# Patient Record
Sex: MALE | Race: WHITE | Employment: OTHER | URBAN - METROPOLITAN AREA
[De-identification: names, ages, dates, MRNs, and addresses within clinical notes are randomized per-mention and may not be internally consistent; named-entity substitution may affect disease eponyms.]

---

## 2017-11-20 ENCOUNTER — OFFICE VISIT (OUTPATIENT)
Dept: HEMATOLOGY | Age: 67
End: 2017-11-20

## 2017-11-20 ENCOUNTER — HOSPITAL ENCOUNTER (OUTPATIENT)
Dept: LAB | Age: 67
Discharge: HOME | End: 2017-11-20
Attending: FAMILY MEDICINE
Payer: MEDICARE

## 2017-11-20 VITALS
WEIGHT: 183 LBS | SYSTOLIC BLOOD PRESSURE: 103 MMHG | DIASTOLIC BLOOD PRESSURE: 38 MMHG | OXYGEN SATURATION: 95 % | RESPIRATION RATE: 18 BRPM | HEART RATE: 72 BPM | TEMPERATURE: 97.7 F | HEIGHT: 68 IN | BODY MASS INDEX: 27.74 KG/M2

## 2017-11-20 DIAGNOSIS — R74.8 ELEVATED LIVER ENZYMES: Primary | ICD-10-CM

## 2017-11-20 PROBLEM — Z95.828 S/P FEMORAL-POPLITEAL BYPASS SURGERY: Status: ACTIVE | Noted: 2017-11-20

## 2017-11-20 PROBLEM — Z95.2 H/O AORTIC VALVE REPLACEMENT: Status: ACTIVE | Noted: 2017-11-20

## 2017-11-20 PROBLEM — Z98.890 H/O ARTHROSCOPIC KNEE SURGERY: Status: ACTIVE | Noted: 2017-11-20

## 2017-11-20 PROBLEM — I73.9 PERIPHERAL VASCULAR DISEASE (HCC): Status: ACTIVE | Noted: 2017-11-20

## 2017-11-20 PROBLEM — I25.10 CAD (CORONARY ARTERY DISEASE): Status: ACTIVE | Noted: 2017-11-20

## 2017-11-20 PROBLEM — Z95.0 S/P PLACEMENT OF CARDIAC PACEMAKER: Status: ACTIVE | Noted: 2017-11-20

## 2017-11-20 LAB
ALBUMIN SERPL-MCNC: 3.2 G/DL (ref 3.4–5)
ALBUMIN/GLOB SERPL: 0.9 {RATIO} (ref 0.8–1.7)
ALP SERPL-CCNC: 194 U/L (ref 45–117)
ALT SERPL-CCNC: 49 U/L (ref 16–61)
ANION GAP SERPL CALC-SCNC: 9 MMOL/L (ref 3–18)
AST SERPL-CCNC: 35 U/L (ref 15–37)
BASOPHILS # BLD: 0 K/UL (ref 0–0.06)
BASOPHILS NFR BLD: 0 % (ref 0–2)
BILIRUB DIRECT SERPL-MCNC: 0.1 MG/DL (ref 0–0.2)
BILIRUB SERPL-MCNC: 0.4 MG/DL (ref 0.2–1)
BUN SERPL-MCNC: 44 MG/DL (ref 7–18)
BUN/CREAT SERPL: 20 (ref 12–20)
CALCIUM SERPL-MCNC: 8.6 MG/DL (ref 8.5–10.1)
CHLORIDE SERPL-SCNC: 110 MMOL/L (ref 100–108)
CHOLEST SERPL-MCNC: 105 MG/DL
CO2 SERPL-SCNC: 24 MMOL/L (ref 21–32)
CREAT SERPL-MCNC: 2.19 MG/DL (ref 0.6–1.3)
DIFFERENTIAL METHOD BLD: ABNORMAL
EOSINOPHIL # BLD: 0.1 K/UL (ref 0–0.4)
EOSINOPHIL NFR BLD: 4 % (ref 0–5)
ERYTHROCYTE [DISTWIDTH] IN BLOOD BY AUTOMATED COUNT: 14 % (ref 11.6–14.5)
EST. AVERAGE GLUCOSE BLD GHB EST-MCNC: 140 MG/DL
FERRITIN SERPL-MCNC: 246 NG/ML (ref 8–388)
GLOBULIN SER CALC-MCNC: 3.4 G/DL (ref 2–4)
GLUCOSE SERPL-MCNC: 183 MG/DL (ref 74–99)
HBA1C MFR BLD: 6.5 % (ref 4.5–5.6)
HCT VFR BLD AUTO: 34.4 % (ref 36–48)
HDLC SERPL-MCNC: 23 MG/DL (ref 40–60)
HDLC SERPL: 4.6 {RATIO} (ref 0–5)
HGB BLD-MCNC: 11 G/DL (ref 13–16)
INR PPP: 1.2 (ref 0.8–1.2)
IRON SATN MFR SERPL: 33 %
IRON SERPL-MCNC: 102 UG/DL (ref 50–175)
LDLC SERPL CALC-MCNC: 65.4 MG/DL (ref 0–100)
LIPID PROFILE,FLP: ABNORMAL
LYMPHOCYTES # BLD: 1 K/UL (ref 0.9–3.6)
LYMPHOCYTES NFR BLD: 28 % (ref 21–52)
MCH RBC QN AUTO: 29.6 PG (ref 24–34)
MCHC RBC AUTO-ENTMCNC: 32 G/DL (ref 31–37)
MCV RBC AUTO: 92.7 FL (ref 74–97)
MONOCYTES # BLD: 0.4 K/UL (ref 0.05–1.2)
MONOCYTES NFR BLD: 10 % (ref 3–10)
NEUTS SEG # BLD: 2.1 K/UL (ref 1.8–8)
NEUTS SEG NFR BLD: 58 % (ref 40–73)
PLATELET # BLD AUTO: 88 K/UL (ref 135–420)
PMV BLD AUTO: 12.3 FL (ref 9.2–11.8)
POTASSIUM SERPL-SCNC: 5.9 MMOL/L (ref 3.5–5.5)
PROT SERPL-MCNC: 6.6 G/DL (ref 6.4–8.2)
PROTHROMBIN TIME: 15 SEC (ref 11.5–15.2)
RBC # BLD AUTO: 3.71 M/UL (ref 4.7–5.5)
SODIUM SERPL-SCNC: 143 MMOL/L (ref 136–145)
TIBC SERPL-MCNC: 313 UG/DL (ref 250–450)
TRIGL SERPL-MCNC: 83 MG/DL (ref ?–150)
VLDLC SERPL CALC-MCNC: 16.6 MG/DL
WBC # BLD AUTO: 3.6 K/UL (ref 4.6–13.2)

## 2017-11-20 PROCEDURE — 86704 HEP B CORE ANTIBODY TOTAL: CPT

## 2017-11-20 PROCEDURE — 87340 HEPATITIS B SURFACE AG IA: CPT

## 2017-11-20 PROCEDURE — 80048 BASIC METABOLIC PNL TOTAL CA: CPT

## 2017-11-20 PROCEDURE — 82107 ALPHA-FETOPROTEIN L3: CPT

## 2017-11-20 PROCEDURE — 82103 ALPHA-1-ANTITRYPSIN TOTAL: CPT

## 2017-11-20 PROCEDURE — 83516 IMMUNOASSAY NONANTIBODY: CPT

## 2017-11-20 PROCEDURE — 86706 HEP B SURFACE ANTIBODY: CPT

## 2017-11-20 PROCEDURE — 80076 HEPATIC FUNCTION PANEL: CPT

## 2017-11-20 PROCEDURE — 80061 LIPID PANEL: CPT

## 2017-11-20 PROCEDURE — 82728 ASSAY OF FERRITIN: CPT

## 2017-11-20 PROCEDURE — 85025 COMPLETE CBC W/AUTO DIFF WBC: CPT

## 2017-11-20 PROCEDURE — 36415 COLL VENOUS BLD VENIPUNCTURE: CPT

## 2017-11-20 PROCEDURE — 86038 ANTINUCLEAR ANTIBODIES: CPT

## 2017-11-20 PROCEDURE — 86803 HEPATITIS C AB TEST: CPT

## 2017-11-20 PROCEDURE — 86708 HEPATITIS A ANTIBODY: CPT

## 2017-11-20 PROCEDURE — 83036 HEMOGLOBIN GLYCOSYLATED A1C: CPT

## 2017-11-20 PROCEDURE — 83540 ASSAY OF IRON: CPT

## 2017-11-20 PROCEDURE — 85610 PROTHROMBIN TIME: CPT

## 2017-11-20 RX ORDER — ATORVASTATIN CALCIUM 40 MG/1
TABLET, FILM COATED ORAL DAILY
COMMUNITY

## 2017-11-20 RX ORDER — FUROSEMIDE 40 MG/1
80 TABLET ORAL
COMMUNITY

## 2017-11-20 RX ORDER — PREGABALIN 100 MG/1
100 CAPSULE ORAL 3 TIMES DAILY
COMMUNITY

## 2017-11-20 RX ORDER — TAMSULOSIN HYDROCHLORIDE 0.4 MG/1
0.4 CAPSULE ORAL DAILY
COMMUNITY

## 2017-11-20 RX ORDER — CLOPIDOGREL BISULFATE 75 MG/1
75 TABLET ORAL DAILY
COMMUNITY

## 2017-11-20 RX ORDER — DICLOXACILLIN SODIUM 500 MG/1
CAPSULE ORAL 2 TIMES DAILY
COMMUNITY

## 2017-11-20 NOTE — PROGRESS NOTES
Humble Collier is a 74 y.o. male    No chief complaint on file. 1. Have you been to the ER, urgent care clinic or hospitalized since your last visit? NO.     2. Have you seen or consulted any other health care providers outside of the [address] [place] [address] since your last visit (Include any pap smears or colon screening)?  NO        Learning Assessment 11/20/2017   PRIMARY LEARNER Patient   BARRIERS PRIMARY LEARNER NONE   CO-LEARNER CAREGIVER No   PRIMARY LANGUAGE ENGLISH   LEARNER PREFERENCE PRIMARY OTHER (COMMENT)   ANSWERED BY patient   RELATIONSHIP SELF

## 2017-11-20 NOTE — PROGRESS NOTES
[address] Martinez MD, Radha Fierro, Cite [place], [place]       Vern Duncan, TARUN Gold, RENATO Marie, JOYCE-TARUN Orozco NP        at [address], [address] [place] [address], [facility].     [phone]     FAX: [phone]    at [facility], [address] [place] [address], [address] [facility] [address]     [phone]     FAX: [phone]         Patient Care Team:  Hakeem Villatoro MD as PCP - General (Family Practice)      Problem List  Date Reviewed: 11/20/2017          Codes Class Noted    H/O aortic valve replacement ICD-10-CM: Z95.2  ICD-9-CM: V43.3  11/20/2017        S/P placement of cardiac pacemaker ICD-10-CM: Z95.0  ICD-9-CM: V45.01  11/20/2017        S/P femoral-popliteal bypass surgery ICD-10-CM: Z95.828  ICD-9-CM: V45.89  11/20/2017        Peripheral vascular disease ([facility].) ICD-10-CM: I73.9  ICD-9-CM: 443.9  11/20/2017        CAD (coronary artery disease) ICD-10-CM: I25.10  ICD-9-CM: 414.00  11/20/2017        H/O arthroscopic knee surgery ICD-10-CM: Z98.890  ICD-9-CM: V45.89  11/20/2017        Elevated liver enzymes ICD-10-CM: R74.8  ICD-9-CM: 790.5  11/20/2017        Chronic kidney disease, stage III (moderate) ICD-10-CM: N18.3  ICD-9-CM: 585.3  6/16/2015        Type II diabetes mellitus ([facility] ICD-10-CM: E11.9  ICD-9-CM: 250.00  6/16/2015        Hypertension ICD-10-CM: I10  ICD-9-CM: 401.9  6/16/2015        CHF (congestive heart failure) ([facility] ICD-10-CM: I50.9  ICD-9-CM: 428.0  6/4/2015                The physicians listed above have asked me to see Dixie Shore in consultation regarding elevated liver enzymes and its management. All medical records sent by the referring physicians were reviewed including imaging studies     The patient is a 74 y.o.   male who was first noted to have abnormalities in liver transaminases and alkaline phosphate in 2/2017. Serologic evaluation for markers of chronic liver disease were negative for HCV. Ultrasound of the liver was performed in 9/2017. The results of the imaging suggested chronic liver disease. An assessment of liver fibrosis with biopsy or elastography has not been performed. The most recent laboratory studies indicate that the liver transaminases are elevated, ALP is elevated, tests of hepatic synthetic and metabolic function are normal, and the platelet count is depressed. The patient has no symptoms which could be attributed to the liver disorder. The patient completes all daily activities without any functional limitations. The patient has not experienced fatigue, pain in the right side over the liver,       ALLERGIES  Allergies   Allergen Reactions    Amoxicillin Rash    Levaquin [Levofloxacin] Rash       MEDICATIONS  Current Outpatient Prescriptions   Medication Sig    clopidogrel (PLAVIX) 75 mg tab Take  by mouth.  pregabalin (LYRICA) 100 mg capsule Take  by mouth two (2) times a day.  insulin NPH/insulin regular (HUMULIN 70/30) 100 unit/mL (70-30) injection by SubCUTAneous route.  furosemide (LASIX) 40 mg tablet Take  by mouth daily.  tamsulosin (FLOMAX) 0.4 mg capsule Take 0.4 mg by mouth daily.  dicloxacillin (DYNAPEN) 500 mg capsule Take  by mouth Before breakfast, lunch, dinner and at bedtime.  atorvastatin (LIPITOR) 40 mg tablet Take  by mouth daily.  aspirin 81 mg chewable tablet Take 1 Tab by mouth daily.  oxyCODONE-acetaminophen (PERCOCET) 5-325 mg per tablet Take 1 Tab by mouth every six (6) hours as needed. Max Daily Amount: 4 Tabs.  b complex-vitamin c-folic acid (NEPHROCAPS) 1 mg capsule Take 1 Cap by mouth daily.  cyanocobalamin (VITAMIN B12) 500 mcg tablet Take 1 Tab by mouth daily.  simvastatin (ZOCOR) 20 mg tablet Take 1 Tab by mouth nightly.     sodium hypochlorite (QUARTER STRENGTH DAKIN'S) 0.125 % soln external solution Apply Topically as directed to both legs Twice daily    insulin lispro (HUMALOG) 100 unit/mL injection Check FSBS Three times daily with meals,   For sugar between 150 and 200- give 1 units SQ,   For sugar between 201 and 250- give 3 units SQ,   For sugar between 251 and 300- give 5 units SQ,   For sugar between 301 and 400- give 7 units SQ,  For sugars > 400, contact PCP    metoprolol (LOPRESSOR) 25 mg tablet Take 0.5 Tabs by mouth every twelve (12) hours.  OTHER Check CBC, CMP, Mg in 4 days, results to PCP and Dr Pauly Lee immediately      Dx- CKD2     No current facility-administered medications for this visit. SYSTEM REVIEW NOT RELATED TO LIVER DISEASE OR REVIEWED ABOVE:  Constitution systems: Negative for fever, chills, weight gain, weight loss. Eyes: Negative for visual changes. ENT: Negative for sore throat, painful swallowing. Respiratory: Negative for cough, hemoptysis, SOB. Cardiology: Negative for chest pain, palpitations. GI:  Negative for constipation or diarrhea. : Negative for urinary frequency, dysuria, hematuria, nocturia. Skin: Negative for rash. Hematology: Negative for easy bruising, blood clots. Musculo-skelatal: Negative for back pain, muscle pain, weakness. Neurologic: Negative for headaches, dizziness, vertigo, memory problems not related to HE. Psychology: Negative for anxiety, depression. FAMILY HISTORY:  The father  of alcohol cirrhosis. The mother is alive and healthy at age 80 years. There is no family history of liver disease. SOCIAL HISTORY:  The patient is . The patient has no children. The patient stopped using tobacco products in 2012. The patient has previously consumed alcohol socially never in excess. The patient has been abstinent from alcohol since . The patient used to work as a . The patient retired in .         PHYSICAL EXAMINATION:  Visit Vitals    BP (!) 103/38 (BP 1 Location: Right arm, BP Patient Position: Sitting)    Pulse 72    Temp 97.7 °F (36.5 °C) (Tympanic)    Resp 18    Ht 5' 8\" (1.727 m)    Wt 183 lb (83 kg)    SpO2 95%    BMI 27.83 kg/m2     General: No acute distress. Eyes: Sclera anicteric. ENT: No oral lesions. Thyroid normal.  Nodes: No adenopathy. Skin: No spider angiomata. No jaundice. No palmar erythema. Respiratory: Lungs clear to auscultation. Cardiovascular: Regular heart rate. No murmurs. No JVD. Abdomen: Soft non-tender. Liver size normal to percussion/palpation. Spleen not palpable. No obvious ascites. Extremities: No edema. No muscle wasting. No gross arthritic changes. Neurologic: Alert and oriented. Cranial nerves grossly intact. No asterixis.     LABORATORY STUDIES:  [facility] [place] [address] Units 11/20/2017   WBC 3.4 - 10.8 x10E3/uL 3.6 (L)   ANC 1.4 - 7.0 x10E3/uL 2.1   HGB 13.0 - 17.7 g/dL 11.0 (L)    - 379 x10E3/uL 88 (L)   INR 0.8 - 1.2   1.2   AST 0 - 40 IU/L 35   ALT 0 - 44 IU/L 49   Alk Phos 39 - 117 IU/L 194 (H)   Bili, Total 0.0 - 1.2 mg/dL 0.4   Bili, Direct 0.00 - 0.40 mg/dL 0.1   Albumin 3.6 - 4.8 g/dL 3.2 (L)   BUN 8 - 27 mg/dL 44 (H)   Creat 0.76 - 1.27 mg/dL 2.19 (H)   Na 134 - 144 mmol/L 143   K 3.5 - 5.2 mmol/L 5.9 (H)   Cl 96 - 106 mmol/L 110 (H)   CO2 18 - 29 mmol/L 24   Glucose 65 - 99 mg/dL 183 (H)   Magnesium 1.8 - 2.4 mg/dL    Ammonia 11 - 32 UMOL/L      SEROLOGIES:  Serologies Latest Ref Rng & Units 11/20/2017   Hep A Ab, Total NEGATIVE   NEGATIVE   Hep B Surface Ag <1.00 Index <0.10   Hep B Surface Ag Interp NEG   NEGATIVE   Hep B Core Ab, Total NEGATIVE   NEGATIVE   Hep B Surface Ab >10.0 mIU/mL <3.10 (L)   Hep B Surface Ab Interp POS   NEGATIVE (A)   Hep C Ab 0.0 - 0.9 s/co ratio <0.1   Ferritin 8 - 388 NG/   Iron % Saturation % 33   MARIOLA, IFA  NEGATIVE   C-ANCA Neg:<1:20 titer    P-ANCA Neg:<1:20 titer    ANCA Neg:<1:20 titer    ASMCA 0 - 19 Units 24 (H)   M2 Ab 0.0 - 20.0 Units 3.7   Alpha-1 antitrypsin level 90 - 200 mg/dL 163     LIVER HISTOLOGY:  Not available or performed    ENDOSCOPIC PROCEDURES:  Not available or performed    RADIOLOGY:  Not available or performed    OTHER TESTING:  Not available or performed    ASSESSMENT AND PLAN:  Persistent elevation in alkaline phosphate of unclear etiology at this time. The serum albumin is reduced. The platelet count is depressed. The patient has CKD that is probably fom DM and heart disease. The low serum albumin may be from urine protein loss. Based upon laboratory studies and imaging the patient may have cirrhosis. Serologic testing to help define the cause of the laboratory abnormality were ordered. Results were positive for ASMA. The most likely causes for the liver chemistry abnormalities were discussed with the patient and include immune liver disorders,     Will perform laboratory testing to monitor liver function and degree of liver injury. This included BMP, hepatic panel, CBC with platelet count, INR. Will perform imaging of the liver with ultrasound. The need to perform a liver biopsy to help determine the cause and severity of the liver test abnormalities was discussed. The risks of performing the liver biopsy including pain, puncture of the lung, gallbladder, intestine or kidney and bleeding were discussed. Will defer liver biopsy for now. The patient was directed to continue all current medications at the current dosages. There are no contraindications for the patient to take any medications that are necessary for treatment of other medical issues. The patient was counseled regarding alcohol consumption. Vaccination for viral hepatitis A is recommended since the patient has no serologic evidence of previous exposure or vaccination with immunity. All of the above issues were discussed with the patient. All questions were answered.   The patient expressed a clear understanding of the above. [facility] 87 in 4 weeks to review all data and determine the treatment plan.     Viviana Giron MD  [facility] [place] of [address] [place] [address], [address] [place] [address] [place]uly Norris, [address] [place] [address] Street [address]  [phone]

## 2017-11-20 NOTE — MR AVS SNAPSHOT
Visit Information Date & Time Provider Department Dept. Phone Encounter #  
 11/20/2017 10:00 AM [facility]  [address] [id number] Follow-up Instructions Return in about 4 weeks (around 12/18/2017) for MLS. Upcoming Health Maintenance Date Due Hepatitis C Screening 1950 MICROALBUMIN Q1 6/8/1962 EYE EXAM RETINAL OR DILATED Q1 6/8/1962 DTaP/Tdap/Td series (1 - Tdap) 6/8/1973 FOBT Q 1 YEAR AGE 50-75 6/8/2002 ZOSTER VACCINE AGE 60> 4/8/2012 HEMOGLOBIN A1C Q6M 12/4/2015 LIPID PANEL Q1 6/5/2016 FOOT EXAM Q1 6/9/2016 GLAUCOMA SCREENING Q2Y 6/8/2017 Pneumococcal 65+ Low/Medium Risk (1 of 2 - PCV13) 6/8/2017 MEDICARE YEARLY EXAM 6/8/2017 Influenza Age 5 to Adult 8/1/2017 Allergies as of 11/20/2017  Review Complete On: 11/20/2017 By: May Smallwood Severity Noted Reaction Type Reactions Amoxicillin  06/04/2015    Rash Levaquin [Levofloxacin]  06/04/2015    Rash Current Immunizations  Never Reviewed No immunizations on file. Not reviewed this visit You Were Diagnosed With   
  
 Codes Comments Elevated liver enzymes    -  Primary ICD-10-CM: R74.8 ICD-9-CM: 790.5 Vitals BP Pulse Temp Resp Height(growth percentile) (!) 103/38 (BP 1 Location: Right arm, BP Patient Position: Sitting) 72 97.7 °F (36.5 °C) (Tympanic) 18 5' 8\" (1.727 m) Weight(growth percentile) SpO2 BMI Smoking Status 183 lb (83 kg) 95% 27.83 kg/m2 Never Smoker BMI and BSA Data Body Mass Index Body Surface Area  
 27.83 kg/m 2 2 m 2 Your Updated Medication List  
  
   
This list is accurate as of: 11/20/17 11:04 AM.  Always use your most recent med list.  
  
  
  
  
 aspirin 81 mg chewable tablet Take 1 Tab by mouth daily. b complex-vitamin c-folic acid 1 mg capsule Commonly known as:  Sierra Smithton Take 1 Cap by mouth daily. cyanocobalamin 500 mcg tablet Commonly known as:  VITAMIN B12 Take 1 Tab by mouth daily. dicloxacillin 500 mg capsule Commonly known as:  Russella Fought Take  by mouth Before breakfast, lunch, dinner and at bedtime. HumuLIN 70/30 100 unit/mL (70-30) injection Generic drug:  insulin NPH/insulin regular  
by SubCUTAneous route. insulin lispro 100 unit/mL injection Commonly known as:  HUMALOG Check FSBS Three times daily with meals,  For sugar between 150 and 200- give 1 units SQ,  For sugar between 201 and 250- give 3 units SQ,  For sugar between 251 and 300- give 5 units SQ,  For sugar between 301 and 400- give 7 units SQ, For sugars > 400, contact PCP  
  
 LASIX 40 mg tablet Generic drug:  furosemide Take  by mouth daily. LIPITOR 40 mg tablet Generic drug:  atorvastatin Take  by mouth daily. LYRICA 100 mg capsule Generic drug:  pregabalin Take  by mouth two (2) times a day. metoprolol tartrate 25 mg tablet Commonly known as:  LOPRESSOR Take 0.5 Tabs by mouth every twelve (12) hours. OTHER Check CBC, CMP, Mg in 4 days, results to PCP and Dr Leah Baumgarten immediately   Dx- Carrie Salt oxyCODONE-acetaminophen 5-325 mg per tablet Commonly known as:  PERCOCET Take 1 Tab by mouth every six (6) hours as needed. Max Daily Amount: 4 Tabs. PLAVIX 75 mg Tab Generic drug:  clopidogrel Take  by mouth. simvastatin 20 mg tablet Commonly known as:  ZOCOR Take 1 Tab by mouth nightly.  
  
 sodium hypochlorite 0.125 % Soln external solution Commonly known as:  [address] Apply Topically as directed to both legs Twice daily  
  
 tamsulosin 0.4 mg capsule Commonly known as:  FLOMAX Take 0.4 mg by mouth daily. Follow-up Instructions Return in about 4 weeks (around 12/18/2017) for MLS. To-Do List   
 11/20/2017 Lab:  ACTIN (SMOOTH MUSCLE) ANTIBODY   
  
 11/20/2017 Lab:  AFP WITH AFP-L3% 11/20/2017 Lab:  ALPHA-1-ANTITRYPSIN, TOTAL   
  
 11/20/2017 Lab:  ANTINUCLEAR ANTIBODIES, IFA   
  
 11/20/2017 Lab:  CBC WITH AUTOMATED DIFF   
  
 11/20/2017 Lab:  FERRITIN   
  
 11/20/2017 Lab:  HCV AB W/REFLEX VERIFICATION   
  
 11/20/2017 Lab:  HEMOGLOBIN A1C WITH EAG   
  
 11/20/2017 Lab:  HEP A AB, TOTAL   
  
 11/20/2017 Lab:  HEP B SURFACE AB   
  
 11/20/2017 Lab:  HEP B SURFACE AG   
  
 11/20/2017 Lab:  HEPATIC FUNCTION PANEL   
  
 11/20/2017 Lab:  HEPATITIS B CORE AB, TOTAL   
  
 11/20/2017 Lab:  IRON PROFILE   
  
 11/20/2017 Lab:  LIPID PANEL   
  
 11/20/2017 Lab:  METABOLIC PANEL, BASIC   
  
 11/20/2017 Lab:  MITOCHONDRIAL M2 AB   
  
 11/20/2017 Lab:  PROTHROMBIN TIME + INR   
  
 11/20/2017 Imaging:  US ABD LTD W ELASTOGRAPHY Introducing \[facility] [place] [facility]\"" & HEALTH SERVICES! Diana Magallanes introduces [employer] patient portal. Now you can access parts of your medical record, email your doctor's office, and request medication refills online. 1. In your internet browser, go to [email or web address] 2. Click on the First Time User? Click Here link in the Sign In box. You will see the New Member Sign Up page. 3. Enter your [employer] Access Code exactly as it appears below. You will not need to use this code after youve completed the sign-up process. If you do not sign up before the expiration date, you must request a new code. · [employer] Access Code: [email or web address] Expires: 2/18/2018 11:04 AM 
 
4. Enter the last four digits of your Social Security Number (xxxx) and Date of Birth (mm/dd/yyyy) as indicated and click Submit. You will be taken to the next sign-up page. 5. Create a Zadbyt ID. This will be your [employer] login ID and cannot be changed, so think of one that is secure and easy to remember. 6. Create a Zadbyt password. You can change your password at any time. 7. Enter your Password Reset Question and Answer. This can be used at a later time if you forget your password. 8. Enter your e-mail address. You will receive e-mail notification when new information is available in [address] 9. Click Sign Up. You can now view and download portions of your medical record. 10. Click the Download Summary menu link to download a portable copy of your medical information. If you have questions, please visit the Frequently Asked Questions section of the Before the Call website. Remember, Before the Call is NOT to be used for urgent needs. For medical emergencies, dial 911. Now available from your iPhone and Android! Please provide this summary of care documentation to your next provider. Your primary care clinician is listed as [address] If you have any questions after today's visit, please call [phone].

## 2017-11-21 LAB
A1AT SERPL-MCNC: 163 MG/DL (ref 90–200)
ACTIN IGG SERPL-ACNC: 24 UNITS (ref 0–19)
AFP L3 MFR SERPL: NORMAL % (ref 0–9.9)
AFP SERPL-MCNC: 2 NG/ML (ref 0–8)
COMMENT, 144067: NORMAL
HAV AB SER QL IA: NEGATIVE
HBV CORE AB SERPL QL IA: NEGATIVE
HBV SURFACE AB SER QL IA: NEGATIVE
HBV SURFACE AB SERPL IA-ACNC: <3.1 MIU/ML
HBV SURFACE AG SER QL: <0.1 INDEX
HBV SURFACE AG SER QL: NEGATIVE
HCV AB S/CO SERPL IA: <0.1 S/CO RATIO (ref 0–0.9)
HEP BS AB COMMENT,HBSAC: ABNORMAL
MITOCHONDRIA M2 IGG SER-ACNC: 3.7 UNITS (ref 0–20)

## 2017-11-22 LAB — ANA TITR SER IF: NEGATIVE {TITER}

## 2017-12-13 ENCOUNTER — HOSPITAL ENCOUNTER (OUTPATIENT)
Dept: ULTRASOUND IMAGING | Age: 67
Discharge: HOME | End: 2017-12-13
Attending: INTERNAL MEDICINE
Payer: MEDICARE

## 2017-12-13 DIAGNOSIS — R74.8 ELEVATED LIVER ENZYMES: ICD-10-CM

## 2017-12-13 PROCEDURE — 76705 ECHO EXAM OF ABDOMEN: CPT

## 2017-12-19 ENCOUNTER — OFFICE VISIT (OUTPATIENT)
Dept: HEMATOLOGY | Age: 67
End: 2017-12-19

## 2017-12-19 VITALS
TEMPERATURE: 97 F | HEART RATE: 70 BPM | WEIGHT: 185 LBS | BODY MASS INDEX: 28.04 KG/M2 | HEIGHT: 68 IN | DIASTOLIC BLOOD PRESSURE: 54 MMHG | RESPIRATION RATE: 18 BRPM | OXYGEN SATURATION: 98 % | SYSTOLIC BLOOD PRESSURE: 133 MMHG

## 2017-12-19 DIAGNOSIS — R74.8 ELEVATED LIVER ENZYMES: Primary | ICD-10-CM

## 2017-12-19 RX ORDER — PROMETHAZINE HYDROCHLORIDE 12.5 MG/1
12.5 TABLET ORAL
COMMUNITY
Start: 2017-07-12

## 2017-12-19 RX ORDER — OXYCODONE HYDROCHLORIDE 5 MG/1
7.5 TABLET ORAL
COMMUNITY
Start: 2017-09-07

## 2017-12-19 RX ORDER — WARFARIN 2 MG/1
2 TABLET ORAL
COMMUNITY
Start: 2013-12-28 | End: 2018-02-19

## 2017-12-19 NOTE — ACP (ADVANCE CARE PLANNING)
Do you have an Advanced Directive? YES    Would you like information on Advanced Directives? NO      Was information provided?  NO

## 2017-12-19 NOTE — MR AVS SNAPSHOT
Visit Information Date & Time Provider Department Dept. Phone Encounter #  
 12/19/2017 10:15 AM Darinel Smith MD [facility] [address] of  [address] [id number] Follow-up Instructions Return for 2 weeks after LBX. Upcoming Health Maintenance Date Due MICROALBUMIN Q1 6/8/1962 EYE EXAM RETINAL OR DILATED Q1 6/8/1962 DTaP/Tdap/Td series (1 - Tdap) 6/8/1973 FOBT Q 1 YEAR AGE 50-75 6/8/2002 ZOSTER VACCINE AGE 60> 4/8/2012 FOOT EXAM Q1 6/9/2016 GLAUCOMA SCREENING Q2Y 6/8/2017 Pneumococcal 65+ Low/Medium Risk (1 of 2 - PCV13) 6/8/2017 MEDICARE YEARLY EXAM 6/8/2017 Influenza Age 5 to Adult 8/1/2017 HEMOGLOBIN A1C Q6M 5/20/2018 LIPID PANEL Q1 11/20/2018 Allergies as of 12/19/2017  Review Complete On: 12/19/2017 By: Priyanka Oates Severity Noted Reaction Type Reactions Amoxicillin  06/04/2015    Rash Levaquin [Levofloxacin]  06/04/2015    Rash Current Immunizations  Never Reviewed No immunizations on file. Not reviewed this visit You Were Diagnosed With   
  
 Codes Comments Elevated liver enzymes    -  Primary ICD-10-CM: R74.8 ICD-9-CM: 790.5 Vitals BP Pulse Temp Resp Height(growth percentile) Weight(growth percentile) 133/54 (BP 1 Location: Right arm, BP Patient Position: Sitting) 70 97 °F (36.1 °C) (Tympanic) 18 5' 8\" (1.727 m) 185 lb (83.9 kg) SpO2 BMI Smoking Status 98% 28.13 kg/m2 Never Smoker Vitals History BMI and BSA Data Body Mass Index Body Surface Area  
 28.13 kg/m 2 2.01 m 2 Your Updated Medication List  
  
   
This list is accurate as of: 12/19/17 10:51 AM.  Always use your most recent med list.  
  
  
  
  
 aspirin 81 mg chewable tablet Take 1 Tab by mouth daily. b complex-vitamin c-folic acid 1 mg capsule Commonly known as:  Sheron Master Take 1 Cap by mouth daily. COUMADIN 2 mg tablet Generic drug:  warfarin  
2 mg.  
  
 cyanocobalamin 500 mcg tablet Commonly known as:  VITAMIN B12 Take 1 Tab by mouth daily. dicloxacillin 500 mg capsule Commonly known as:  Chana Miguel Take  by mouth Before breakfast, lunch, dinner and at bedtime. HumuLIN 70/30 100 unit/mL (70-30) injection Generic drug:  insulin NPH/insulin regular  
by SubCUTAneous route. insulin lispro 100 unit/mL injection Commonly known as:  HUMALOG Check FSBS Three times daily with meals,  For sugar between 150 and 200- give 1 units SQ,  For sugar between 201 and 250- give 3 units SQ,  For sugar between 251 and 300- give 5 units SQ,  For sugar between 301 and 400- give 7 units SQ, For sugars > 400, contact PCP  
  
 LASIX 40 mg tablet Generic drug:  furosemide Take  by mouth daily. LIPITOR 40 mg tablet Generic drug:  atorvastatin Take  by mouth daily. LYRICA 100 mg capsule Generic drug:  pregabalin Take  by mouth two (2) times a day. metoprolol tartrate 25 mg tablet Commonly known as:  LOPRESSOR Take 0.5 Tabs by mouth every twelve (12) hours. OTHER Check CBC, CMP, Mg in 4 days, results to PCP and Dr Carmita Barreto immediately   Dx- Aravind Davis oxyCODONE IR 5 mg immediate release tablet Commonly known as:  Conner Melena Take 1 Tab by mouth. oxyCODONE-acetaminophen 5-325 mg per tablet Commonly known as:  PERCOCET Take 1 Tab by mouth every six (6) hours as needed. Max Daily Amount: 4 Tabs. PLAVIX 75 mg Tab Generic drug:  clopidogrel Take  by mouth.  
  
 promethazine 12.5 mg tablet Commonly known as:  PHENERGAN Take 1 Tab by mouth. simvastatin 20 mg tablet Commonly known as:  ZOCOR Take 1 Tab by mouth nightly.  
  
 sodium hypochlorite 0.125 % Soln external solution Commonly known as:  [address] Apply Topically as directed to both legs Twice daily  
  
 tamsulosin 0.4 mg capsule Commonly known as:  FLOMAX Take 0.4 mg by mouth daily. Follow-up Instructions Return for 2 weeks after LBX. To-Do List   
 01/18/2018 Procedures:  BIOPSY LIVER Introducing [facility] & HEALTH SERVICES! [facility] introduces [employer] patient portal. Now you can access parts of your medical record, email your doctor's office, and request medication refills online. 1. In your internet browser, go to [email or web address] 2. Click on the First Time User? Click Here link in the Sign In box. You will see the New Member Sign Up page. 3. Enter your Datapipe Access Code exactly as it appears below. You will not need to use this code after youve completed the sign-up process. If you do not sign up before the expiration date, you must request a new code. · Dat[employer]ipe Access Code: [email or web address] Expires: 2/18/2018 11:04 AM 
 
4. Enter the last four digits of your Social Security Number (xxxx) and Date of Birth (mm/dd/yyyy) as indicated and click Submit. You will be taken to the next sign-up page. 5. Create a Datapipe ID. This will be your Datapipe login ID and cannot be changed, so think of one that is secure and easy to remember. 6. Create a Datapipe password. You can change your password at any time. 7. Enter your Password Reset Question and Answer. This can be used at a later time if you forget your password. 8. Enter your e-mail address. You will receive e-mail notification when new information is available in [address] 9. Click Sign Up. You can now view and download portions of your medical record. 10. Click the Download Summary menu link to download a portable copy of your medical information. If you have questions, please visit the Frequently Asked Questions section of the Datapipe website. Remember, Datapipe is NOT to be used for urgent needs. For medical emergencies, dial 911. Now available from your iPhone and Android! Please provide this summary of care documentation to your next provider. Your primary care clinician [facility] . If you have any questions after today's visit, please call [phone].

## 2017-12-19 NOTE — PROGRESS NOTES
Chidi Grullon is a 74 y.o. male    No chief complaint on file. 1. Have you been to the ER, urgent care clinic or hospitalized since your last visit? NO.     2. Have you seen or consulted any other health care providers outside of the [address] [place] [address] since your last visit (Include any pap smears or colon screening)? YES    Patient went to pcp.     Learning Assessment 11/20/2017   PRIMARY LEARNER Patient   BARRIERS PRIMARY LEARNER NONE   CO-LEARNER CAREGIVER No   PRIMARY LANGUAGE ENGLISH   LEARNER PREFERENCE PRIMARY OTHER (COMMENT)   ANSWERED BY patient   RELATIONSHIP SELF

## 2018-02-07 ENCOUNTER — HOSPITAL ENCOUNTER (OUTPATIENT)
Dept: ULTRASOUND IMAGING | Age: 68
Discharge: HOME | End: 2018-02-07
Attending: INTERNAL MEDICINE

## 2018-02-07 ENCOUNTER — HOSPITAL ENCOUNTER (OUTPATIENT)
Age: 68
Setting detail: OUTPATIENT SURGERY
Discharge: HOME | End: 2018-02-07
Attending: INTERNAL MEDICINE | Admitting: INTERNAL MEDICINE

## 2018-02-07 VITALS
OXYGEN SATURATION: 98 % | DIASTOLIC BLOOD PRESSURE: 66 MMHG | SYSTOLIC BLOOD PRESSURE: 138 MMHG | WEIGHT: 188.06 LBS | HEIGHT: 69 IN | TEMPERATURE: 97.5 F | BODY MASS INDEX: 27.85 KG/M2 | HEART RATE: 72 BPM | RESPIRATION RATE: 16 BRPM

## 2018-02-07 DIAGNOSIS — R79.89 ELEVATED LFTS: ICD-10-CM

## 2018-02-07 RX ORDER — HYDROMORPHONE HYDROCHLORIDE 1 MG/ML
1 INJECTION, SOLUTION INTRAMUSCULAR; INTRAVENOUS; SUBCUTANEOUS
Status: CANCELLED | OUTPATIENT
Start: 2018-02-07

## 2018-02-07 RX ORDER — LIDOCAINE HYDROCHLORIDE 10 MG/ML
10 INJECTION INFILTRATION; PERINEURAL ONCE
Status: CANCELLED | OUTPATIENT
Start: 2018-02-07 | End: 2018-02-07

## 2018-02-07 RX ORDER — SODIUM CHLORIDE 0.9 % (FLUSH) 0.9 %
5-10 SYRINGE (ML) INJECTION AS NEEDED
Status: DISCONTINUED | OUTPATIENT
Start: 2018-02-07 | End: 2018-02-12 | Stop reason: HOSPADM

## 2018-02-07 RX ORDER — SODIUM CHLORIDE 0.9 % (FLUSH) 0.9 %
5-10 SYRINGE (ML) INJECTION EVERY 8 HOURS
Status: DISCONTINUED | OUTPATIENT
Start: 2018-02-07 | End: 2018-02-12 | Stop reason: HOSPADM

## 2018-02-07 RX ORDER — ONDANSETRON 2 MG/ML
4 INJECTION INTRAMUSCULAR; INTRAVENOUS
Status: CANCELLED | OUTPATIENT
Start: 2018-02-07

## 2018-02-07 NOTE — PERIOP NOTES
Irritated when ask about medical. Needs lots of encouragement to answers question. plavix and  aspirin last taken yesterday. Doctor Jodee Flores. Cancelled procedure.

## 2018-02-21 ENCOUNTER — HOSPITAL ENCOUNTER (OUTPATIENT)
Age: 68
Setting detail: OUTPATIENT SURGERY
Discharge: HOME | End: 2018-02-21
Attending: INTERNAL MEDICINE | Admitting: INTERNAL MEDICINE
Payer: MEDICARE

## 2018-02-21 ENCOUNTER — HOSPITAL ENCOUNTER (OUTPATIENT)
Dept: ULTRASOUND IMAGING | Age: 68
Discharge: HOME | End: 2018-02-21
Attending: INTERNAL MEDICINE
Payer: MEDICARE

## 2018-02-21 VITALS
SYSTOLIC BLOOD PRESSURE: 112 MMHG | RESPIRATION RATE: 16 BRPM | BODY MASS INDEX: 27.67 KG/M2 | HEART RATE: 71 BPM | DIASTOLIC BLOOD PRESSURE: 57 MMHG | TEMPERATURE: 97.4 F | WEIGHT: 187.4 LBS | OXYGEN SATURATION: 99 %

## 2018-02-21 LAB — GLUCOSE BLD STRIP.AUTO-MCNC: 115 MG/DL (ref 70–110)

## 2018-02-21 PROCEDURE — 82962 GLUCOSE BLOOD TEST: CPT

## 2018-02-21 PROCEDURE — 88313 SPECIAL STAINS GROUP 2: CPT | Performed by: INTERNAL MEDICINE

## 2018-02-21 PROCEDURE — 77030018836 HC SOL IRR NACL ICUM -A: Performed by: INTERNAL MEDICINE

## 2018-02-21 PROCEDURE — 76942 ECHO GUIDE FOR BIOPSY: CPT

## 2018-02-21 PROCEDURE — 88307 TISSUE EXAM BY PATHOLOGIST: CPT | Performed by: INTERNAL MEDICINE

## 2018-02-21 PROCEDURE — 76040000019: Performed by: INTERNAL MEDICINE

## 2018-02-21 PROCEDURE — 77030013826 HC NDL BIOP MAXCOR BARD -B: Performed by: INTERNAL MEDICINE

## 2018-02-21 PROCEDURE — 74011250636 HC RX REV CODE- 250/636: Performed by: INTERNAL MEDICINE

## 2018-02-21 PROCEDURE — 74011000250 HC RX REV CODE- 250: Performed by: INTERNAL MEDICINE

## 2018-02-21 RX ORDER — ONDANSETRON 2 MG/ML
4 INJECTION INTRAMUSCULAR; INTRAVENOUS
Status: DISCONTINUED | OUTPATIENT
Start: 2018-02-21 | End: 2018-02-21 | Stop reason: HOSPADM

## 2018-02-21 RX ORDER — SODIUM CHLORIDE 0.9 % (FLUSH) 0.9 %
5-10 SYRINGE (ML) INJECTION AS NEEDED
Status: DISCONTINUED | OUTPATIENT
Start: 2018-02-21 | End: 2018-02-21 | Stop reason: HOSPADM

## 2018-02-21 RX ORDER — HYDROMORPHONE HYDROCHLORIDE 1 MG/ML
1 INJECTION, SOLUTION INTRAMUSCULAR; INTRAVENOUS; SUBCUTANEOUS
Status: DISCONTINUED | OUTPATIENT
Start: 2018-02-21 | End: 2018-02-21 | Stop reason: HOSPADM

## 2018-02-21 RX ORDER — LIDOCAINE HYDROCHLORIDE 10 MG/ML
10 INJECTION INFILTRATION; PERINEURAL ONCE
Status: COMPLETED | OUTPATIENT
Start: 2018-02-21 | End: 2018-02-21

## 2018-02-21 RX ORDER — SODIUM CHLORIDE 0.9 % (FLUSH) 0.9 %
5-10 SYRINGE (ML) INJECTION EVERY 8 HOURS
Status: DISCONTINUED | OUTPATIENT
Start: 2018-02-21 | End: 2018-02-21 | Stop reason: HOSPADM

## 2018-02-21 RX ADMIN — HYDROMORPHONE HYDROCHLORIDE 1 MG: 1 INJECTION, SOLUTION INTRAMUSCULAR; INTRAVENOUS; SUBCUTANEOUS at 08:36

## 2018-02-21 NOTE — H&P
[address] López Jerry MD, FACP, Cite Solomon Semaj, Chacho Salmon, TARUN Keith, RENATO Noel, ACNP-BC   Mitch Benedict, TARUN Franz [facility] [address] 136    at [address], [address] Henry Aleman  22.    [phone]    FAX: [address] [place] [address], [address]    [phone]    FAX: [phone]       PRE-PROCEDURE NOTE - LIVER BIOPSY    H and P from last office visit reviewed. Allergies reviewed. Out-patient medication list reviewed. Patient Active Problem List   Diagnosis Code    CHF (congestive heart failure) (HCC) I50.9    Chronic kidney disease, stage III (moderate) N18.3    Type II diabetes mellitus ([facility] [address] E11.9    Hypertension I5    H/O aortic valve replacement Z95.2    S/P placement of cardiac pacemaker Z95.0    S/P femoral-popliteal bypass surgery Z95.828    Peripheral vascular disease ([facility] [address] I73.9    CAD (coronary artery disease) I25.10    H/O arthroscopic knee surgery Z98.890    Elevated liver enzymes R74.8       Allergies   Allergen Reactions    Amoxicillin Rash    Levaquin [Levofloxacin] Rash       No current facility-administered medications on file prior to encounter. Current Outpatient Prescriptions on File Prior to Encounter   Medication Sig Dispense Refill    oxyCODONE IR (ROXICODONE) 5 mg immediate release tablet Take 7.5 mg by mouth three (3) times daily as needed.  promethazine (PHENERGAN) 12.5 mg tablet Take 12.5 mg by mouth every six (6) hours as needed.  pregabalin (LYRICA) 100 mg capsule Take 100 mg by mouth three (3) times daily.       insulin NPH/insulin regular (HUMULIN 70/30) 100 unit/mL (70-30) injection 15 Units by SubCUTAneous route Daily (before dinner).  furosemide (LASIX) 40 mg tablet Take 80 mg by mouth daily as needed.  tamsulosin (FLOMAX) 0.4 mg capsule Take 0.4 mg by mouth daily.  aspirin 81 mg chewable tablet Take 1 Tab by mouth daily. 30 Tab 0    clopidogrel (PLAVIX) 75 mg tab Take 75 mg by mouth daily.  dicloxacillin (DYNAPEN) 500 mg capsule Take  by mouth two (2) times a day.  atorvastatin (LIPITOR) 40 mg tablet Take  by mouth daily. For liver biopsy to assess NALFD. The risks of the procedure were discussed with the patient. This included bleeding, pain, and puncture of other organs. All questions were answered. The patient wishes to proceed with the procedure. PHYSICAL EXAMINATION:  VS: per nursing note  General: No acute distress. Eyes: Sclera anicteric. ENT: No oral lesions. Thyroid normal.  Nodes: No adenopathy. Skin: No spider angiomata. No jaundice. No palmar erythema. Respiratory: Lungs clear to auscultation. Cardiovascular: Regular heart rate. No murmurs. No JVD. Abdomen: Soft non-tender, liver size normal to percussion/palpation. Spleen not palpable. No obvious ascites. Extremities: No edema. No muscle wasting. No gross arthritic changes. Neurologic: Alert and oriented. Cranial nerves grossly intact. No asterixis. LABS:  Lab Results   Component Value Date/Time    WBC 3.6 (L) 11/20/2017 11:26 AM    HGB 11.0 (L) 11/20/2017 11:26 AM    HCT 34.4 (L) 11/20/2017 11:26 AM    PLATELET 88 (L) 53/51/9818 11:26 AM    MCV 92.7 11/20/2017 11:26 AM     Lab Results   Component Value Date/Time    INR 1.2 11/20/2017 11:26 AM    Prothrombin time 15.0 11/20/2017 11:26 AM       ASSESSMENT AND PLAN:  Liver biopsy under ultrasound guidance.     Salvador Llamas MD  [facility] of [address] [place] [address], [address] [place] [address], [address] [place] [address] Street - [address]  [phone]

## 2018-02-21 NOTE — PERIOP NOTES
No hematoma, bleeding to R upper abdomen. Paged Doctor Leatha Schmitz re- plavix and aspirin meds. All belogings accounted will escort via w/c. Free from distress.    3 days off plavix and aspirin- pt agreed

## 2018-02-21 NOTE — DISCHARGE INSTRUCTIONS
DISCHARGE SUMMARY from Nurse    PATIENT INSTRUCTIONS:    After general anesthesia or intravenous sedation, for 24 hours or while taking prescription Narcotics:  · Limit your activities  · Do not drive and operate hazardous machinery  · Do not make important personal or business decisions  · Do  not drink alcoholic beverages  · If you have not urinated within 8 hours after discharge, please contact your surgeon on call. Report the following to your surgeon:  · Excessive pain, swelling, redness or odor of or around the surgical area  · Temperature over 100.5  · Nausea and vomiting lasting longer than 4 hours or if unable to take medications  · Any signs of decreased circulation or nerve impairment to extremity: change in color, persistent  numbness, tingling, coldness or increase pain  · Any questions    What to do at Home:  Recommended activity: as instructed by Doctor Divine More  If you experience any of the following symptoms bleeding to R upper abdominal call 911, please follow up with Doctor Divine More. *  Please give a list of your current medications to your Primary Care Provider. *  Please update this list whenever your medications are discontinued, doses are      changed, or new medications (including over-the-counter products) are added. *  Please carry medication information at all times in case of emergency situations. These are general instructions for a healthy lifestyle:    No smoking/ No tobacco products/ Avoid exposure to second hand smoke  Surgeon General's Warning:  Quitting smoking now greatly reduces serious risk to your health.     Obesity, smoking, and sedentary lifestyle greatly increases your risk for illness    A healthy diet, regular physical exercise & weight monitoring are important for maintaining a healthy lifestyle    You may be retaining fluid if you have a history of heart failure or if you experience any of the following symptoms:  Weight gain of 3 pounds or more overnight or 5 pounds in a week, increased swelling in our hands or feet or shortness of breath while lying flat in bed. Please call your doctor as soon as you notice any of these symptoms; do not wait until your next office visit. Recognize signs and symptoms of STROKE:    F-face looks uneven    A-arms unable to move or move unevenly    S-speech slurred or non-existent    T-time-call 911 as soon as signs and symptoms begin-DO NOT go       Back to bed or wait to see if you get better-TIME IS BRAIN. Warning Signs of HEART ATTACK     Call 911 if you have these symptoms:   Chest discomfort. Most heart attacks involve discomfort in the center of the chest that lasts more than a few minutes, or that goes away and comes back. It can feel like uncomfortable pressure, squeezing, fullness, or pain.  Discomfort in other areas of the upper body. Symptoms can include pain or discomfort in one or both arms, the back, neck, jaw, or stomach.  Shortness of breath with or without chest discomfort.  Other signs may include breaking out in a cold sweat, nausea, or lightheadedness. Don't wait more than five minutes to call 911 - MINUTES MATTER! Fast action can save your life. Calling 911 is almost always the fastest way to get lifesaving treatment. Emergency Medical Services staff can begin treatment when they arrive -- up to an hour sooner than if someone gets to the hospital by car. The discharge information has been reviewed with the patient and caregiver. The patient and caregiver verbalized understanding. Discharge medications reviewed with the patient and caregiver and appropriate educational materials and side effects teaching were provided.   ___________________________________________________________________________________________________________________________________BON [address] Jonelle Braun MD, FACP, Cite Solomon Cha, FAASLD       TARUN Pelaez PA-C Hiawatha Moan, JOYCE-BC   Brett Herrera, TARUN Bear [facility] 136    at [address], [address] Henry Aleman  22.    [phone]    FAX: [address] [place] [address], [address] [place] [address] [facility] [address]    [phone]    FAX: [phone]       LIVER BIOPSY DISCHARGE INSTRUCTIONS      Miguel A Andres  1950  Date: 2/21/2018    DIET:    Shweta Soni may resume your previous diet. ACTIVITIES:  Rest quietly the rest of today. You should not lift any objects more than 20 pounds for the next 2 days. If you work sitting down without strenuous activity you may return to work tomorrow. If you exert yourself or do heavy lifting at work you should take tomorrow off. Do not drive or operate hazardous machinery for 12 hours. SPECIAL INSTRUCTIONS:  Do not use any aspirin or non-steroidal (Motin, Advil, Naproxen, etc) pain medications for the next 3 days. You may use extra-strength Tylenol (acetaminophen) if you experience pain or discomfort later today. Call the [address] office if you experience any of the following:  Persistent or severe abdominal pain. Persistent or severe abdominal distention. Fever and chills   Nausea and vomiting. New or unusual symptoms. Follow-up care: You should have a follow up appointment with Dr. Lianna Doe to review the results of the liver biopsy results in 2 weeks. If you do not have an appointment please call the office at the number listed above to schedule this. Other instructions: If you have any problems or questions call the [address] office at the phone number listed above. DISCHARGE SUMMARY from Nurse:     The following personal items collected during your admission are returned to you:   Dental Appliance:    Vision: Visual Aid: None  Hearing Aid:    Jewelry:    Clothing:    Other Valuables:    Valuables sent to safe:     Patient armband removed and shredded    3 days off plavix and aspirin      Dual AVS reviewed with Kumar Mata RN. All medications reviewed individually with patient. Opportunities for questions and concerns provided. Patient discharged via (mode of transport ie. Car, ambulance or air transport) [address]. Patient's arm band appropriately discarded.

## 2018-02-21 NOTE — IP AVS SNAPSHOT
[address] [place] [address] 
[phone] Patient: Jackie Randhawa MRN: [id number] DYO:[birth year] A check camila indicates which time of day the medication should be taken. My Medications CONTINUE taking these medications Instructions Each Dose to Equal  
 Morning Noon Evening Bedtime  
 amitriptyline 25 mg tablet Commonly known as:  ELAVIL Your last dose was: Your next dose is: Take 25 mg by mouth nightly. 25 mg  
    
   
   
   
  
 aspirin 81 mg chewable tablet Your last dose was: Your next dose is: Take 1 Tab by mouth daily. 81 mg  
    
   
   
   
  
 dicloxacillin 500 mg capsule Commonly known as:  Frankie Grangerland Your last dose was: Your next dose is: Take  by mouth two (2) times a day. * HumuLIN 70/30 U-100 Insulin 100 unit/mL (70-30) injection Generic drug:  insulin NPH/insulin regular Your last dose was: Your next dose is:    
   
   
 15 Units by SubCUTAneous route Daily (before dinner). 15 Units * HumuLIN 70/30 U-100 Insulin 100 unit/mL (70-30) injection Generic drug:  insulin NPH/insulin regular Your last dose was: Your next dose is:    
   
   
 20 Units by SubCUTAneous route daily (with breakfast). 20 Units LASIX 40 mg tablet Generic drug:  furosemide Your last dose was: Your next dose is: Take 80 mg by mouth daily as needed. 80 mg  
    
   
   
   
  
 LIPITOR 40 mg tablet Generic drug:  atorvastatin Your last dose was: Your next dose is: Take  by mouth daily. LYRICA 100 mg capsule Generic drug:  pregabalin Your last dose was: Your next dose is: Take 100 mg by mouth three (3) times daily.   
 100 mg  
    
   
   
   
 oxyCODONE IR 5 mg immediate release tablet Commonly known as:  Isabel Flores Your last dose was: Your next dose is: Take 7.5 mg by mouth three (3) times daily as needed. 7.5 mg  
    
   
   
   
  
 PLAVIX 75 mg Tab Generic drug:  clopidogrel Your last dose was: Your next dose is: Take 75 mg by mouth daily. 75 mg  
    
   
   
   
  
 promethazine 12.5 mg tablet Commonly known as:  PHENERGAN Your last dose was: Your next dose is: Take 12.5 mg by mouth every six (6) hours as needed. 12.5 mg  
    
   
   
   
  
 tamsulosin 0.4 mg capsule Commonly known as:  FLOMAX Your last dose was: Your next dose is: Take 0.4 mg by mouth daily. 0.4 mg  
    
   
   
   
  
 * Notice: This list has 2 medication(s) that are the same as other medications prescribed for you. Read the directions carefully, and ask your doctor or other care provider to review them with you.

## 2018-02-21 NOTE — IP AVS SNAPSHOT
[address] [address] 
[phone] Patient: Gulshan Ibrahim MRN: [id number] QRS:4/9/5107 About your hospitalization You were admitted on:  February 21, 2018 You last received care in the:  THE [facility] ENDOSCOPY You were discharged on:  February 21, 2018 Why you were hospitalized Your primary diagnosis was:  Not on File Follow-up Information Follow up With Details Comments Contact Info Savannah Melara, [address] [facility] [id number] [phone] Ileana Cabrera MD Follow up on 3/7/2018 AT  21  am [address] [place] [address] [address] [facility] 13 
[phone] Your Scheduled Appointments Wednesday March 07, 2018 10:15 AM EST Follow Up with Ileana Cabrera MD  
[address] [facility] ([address] [place] [address])  
 [place] [id number]  
[phone] Discharge Orders None A check camila indicates which time of day the medication should be taken. My Medications CONTINUE taking these medications Instructions Each Dose to Equal  
 Morning Noon Evening Bedtime  
 amitriptyline 25 mg tablet Commonly known as:  ELAVIL Your last dose was: Your next dose is: Take 25 mg by mouth nightly. 25 mg  
    
   
   
   
  
 aspirin 81 mg chewable tablet Your last dose was: Your next dose is: Take 1 Tab by mouth daily. 81 mg  
    
   
   
   
  
 dicloxacillin 500 mg capsule Commonly known as:  Mgagy Alvarez Your last dose was: Your next dose is: Take  by mouth two (2) times a day. * HumuLIN 70/30 U-100 Insulin 100 unit/mL (70-30) injection Generic drug:  insulin NPH/insulin regular Your last dose was: Your next dose is:    
   
   
 15 Units by SubCUTAneous route Daily (before dinner). 15 Units * HumuLIN 70/30 U-100 Insulin 100 unit/mL (70-30) injection Generic drug:  insulin NPH/insulin regular Your last dose was: Your next dose is:    
   
   
 20 Units by SubCUTAneous route daily (with breakfast). 20 Units LASIX 40 mg tablet Generic drug:  furosemide Your last dose was: Your next dose is: Take 80 mg by mouth daily as needed. 80 mg  
    
   
   
   
  
 LIPITOR 40 mg tablet Generic drug:  atorvastatin Your last dose was: Your next dose is: Take  by mouth daily. LYRICA 100 mg capsule Generic drug:  pregabalin Your last dose was: Your next dose is: Take 100 mg by mouth three (3) times daily. 100 mg  
    
   
   
   
  
 oxyCODONE IR 5 mg immediate release tablet Commonly known as:  Ana Yong Your last dose was: Your next dose is: Take 7.5 mg by mouth three (3) times daily as needed. 7.5 mg  
    
   
   
   
  
 PLAVIX 75 mg Tab Generic drug:  clopidogrel Your last dose was: Your next dose is: Take 75 mg by mouth daily. 75 mg  
    
   
   
   
  
 promethazine 12.5 mg tablet Commonly known as:  PHENERGAN Your last dose was: Your next dose is: Take 12.5 mg by mouth every six (6) hours as needed. 12.5 mg  
    
   
   
   
  
 tamsulosin 0.4 mg capsule Commonly known as:  FLOMAX Your last dose was: Your next dose is: Take 0.4 mg by mouth daily. 0.4 mg  
    
   
   
   
  
 * Notice: This list has 2 medication(s) that are the same as other medications prescribed for you. Read the directions carefully, and ask your doctor or other care provider to review them with you. Discharge Instructions DISCHARGE SUMMARY from Nurse PATIENT INSTRUCTIONS: 
 
 
F-face looks uneven A-arms unable to move or move unevenly S-speech slurred or non-existent T-time-call 911 as soon as signs and symptoms begin-DO NOT go Back to bed or wait to see if you get better-TIME IS BRAIN. Warning Signs of HEART ATTACK Call 911 if you have these symptoms: 
? Chest discomfort. Most heart attacks involve discomfort in the center of the chest that lasts more than a few minutes, or that goes away and comes back. It can feel like uncomfortable pressure, squeezing, fullness, or pain. ? Discomfort in other areas of the upper body. Symptoms can include pain or discomfort in one or both arms, the back, neck, jaw, or stomach. ? Shortness of breath with or without chest discomfort. ? Other signs may include breaking out in a cold sweat, nausea, or lightheadedness. Don't wait more than five minutes to call [address]! Fast action can save your life. Calling 911 is almost always the fastest way to get lifesaving treatment. Emergency Medical Services staff can begin treatment when they arrive  up to an hour sooner than if someone gets to the hospital by car. The discharge information has been reviewed with the patient and caregiver. The patient and caregiver verbalized understanding. Discharge medications reviewed with the patient and caregiver and appropriate educational materials and side effects teaching were provided. ___________________________________________________________________________________________________________________________________BON 608 [facility] 3643 [facility],[address] Hillary Mcleod MD, ALEJANDRO Fall NP 
 RENATO Peterson, [facility]-BC   TARUN Kaplan NP [facility] 136 
  at [address] 
  [address] [place] [address], [facility] Henry Aleman  22. 
  [phone] FAX: [address] [place] [address] 
  [address], [address] Joceline Ibarra, [address] 
  [phone] FAX: [phone] LIVER BIOPSY DISCHARGE INSTRUCTIONS Merly Atkins 1950 Date: 2/21/2018 DIET:   
You may resume your previous diet. ACTIVITIES: 
Rest quietly the rest of today. You should not lift any objects more than 20 pounds for the next 2 days. If you work sitting down without strenuous activity you may return to work tomorrow. If you exert yourself or do heavy lifting at work you should take tomorrow off. Do not drive or operate hazardous machinery for 12 hours. SPECIAL INSTRUCTIONS: 
Do not use any aspirin or non-steroidal (Motin, Advil, Naproxen, etc) pain medications for the next 3 days. You may use extra-strength Tylenol (acetaminophen) if you experience pain or discomfort later today. [facility] if you experience any of the following: 
Persistent or severe abdominal pain. Persistent or severe abdominal distention. Fever and chills Nausea and vomiting. New or unusual symptoms. Follow-up care: You should have a follow up appointment with Dr. Cruz Matthew to review the results of the liver biopsy results in 2 weeks. If you do not have an appointment please call the office at the number listed above to schedule this. Other instructions: If you have any problems or questions call the [facility] office at the phone number listed above. DISCHARGE SUMMARY from Nurse: The following personal items collected during your admission are returned to you:  
Dental Appliance:   
Vision: Visual Aid: None Hearing Aid:   
 Jewelry:   
Clothing:   
Other Valuables:   
Valuables sent to safe:    
Patient armband removed and shredded Introducing [facility]! Bety Stuart introduces Wish Days patient portal. Now you can access parts of your medical record, email your doctor's office, and request medication refills online. 1. In your internet browser, go to [email or web address] 2. Click on the First Time User? Click Here link in the Sign In box. You will see the New Member Sign Up page. 3. Enter your Wish Days Access Code exactly as it appears below. You will not need to use this code after youve completed the sign-up process. If you do not sign up before the expiration date, you must request a new code. · Wish Days Access Code: [email or web address] Expires: 5/21/2018  4:29 PM 
 
4. Enter the last four digits of your Social Security Number (xxxx) and Date of Birth (mm/dd/yyyy) as indicated and click Submit. You will be taken to the next sign-up page. 5. Create a Wish Days ID. This will be your Wish Days login ID and cannot be changed, so think of one that is secure and easy to remember. 6. Create a Wish Days password. You can change your password at any time. 7. Enter your Password Reset Question and Answer. This can be used at a later time if you forget your password. 8. Enter your e-mail address. You will receive e-mail notification when new information is available in [address] 9. Click Sign Up. You can now view and download portions of your medical record. 10. Click the Download Summary menu link to download a portable copy of your medical information. If you have questions, please visit the Frequently Asked Questions section of the Wish Days website. Remember, Wish Days is NOT to be used for urgent needs. For medical emergencies, dial 911. Now available from your iPhone and Android! Providers Seen During Your Hospitalization Provider Specialty Primary office phone Adrienne Ward MD Hepatology [phone] Your Primary Care Physician (PCP) Primary Care Physician Office Phone Office Fax Albert Mota [phone] You are allergic to the following Allergen Reactions Amoxicillin Rash Levaquin (Levofloxacin) Rash Recent Documentation Weight BMI Smoking Status 85 kg 27.67 kg/m2 Former Smoker Emergency Contacts Name Discharge Info Relation Home Work Mobile [address] CAREGIVER [3] Mother [14] [phone] Patient Belongings The following personal items are in your possession at time of discharge: 
     Visual Aid: None Please provide this summary of care documentation to your next provider. Signatures-by signing, you are acknowledging that this After Visit Summary has been reviewed with you and you have received a copy. Patient Signature:  ____________________________________________________________ Date:  ____________________________________________________________  
  
[facility] Provider Signature:  ____________________________________________________________ Date:  ____________________________________________________________

## 2018-02-21 NOTE — PROCEDURES
70 López Jerry MD, [address], [place], [place]       TARUN Goode PA-C Blondell Bowen, [facility]ART-BC   TARUN Manjarrez NP [address] [facility] 136    at [address], [address] [place]  22.    [phone]    FAX: [address] [place] [address], [address] [place] [address] - [address]    [phone]    FAX: [phone]       LIVER BIOPSY PROCEDURE NOTE    Terry Stewart  1950    INDICATIONS/PRE-OPERATIVE  DIAGNOSIS:  Suspect NAFLD    : Maxi Menjivar MD    SEDATION: 1% Lidocaine injection 10 ml    PROCEDURE:  Informed consent to perform the procedure was obtained from the patient. The patient was positioned on the edge of the stretcher lying flat in the supine position. Ultrasound was utilized to image the liver. The diaphragm and any major mass lesion or vascular structures within the liver were identified. An appropriate site for liver biopsy was identified. The distance from the surface of the skin to the liver capsule was 3 cm. This area was prepped with betadine and draped in sterile fashion. The skin was infiltrated with 1% lidocaine. The deeper subcutanous tissues and liver capsule overlying the biopsy site were then infiltrated with 1% lidocaine until appropriate anesthesia was obtained. A small incision was made in the skin so the biopsy devise could be easily inserted. A total of 3 passes with the 18 gauge Bard biopsy devise was then made into the liver. Core(s) of liver tissue totaling 3 cm in length were obtained and placed into tissue fixative. A band aid was placed over the biopsy site.   The patient was then repositioned on the right side and transported to the recovery area on the stretcher for routine monitoring until discharge. The specimen was sent to pathology for processing via the normal transport mechanism. SPECIMEN REMOVED: Liver    ESTIMATED BLOOD LOSS: Negligible.       POST-OPERATIVE DIAGNOSIS: Same as Pre-operative Diagnosis    Ileana Cabrera MD       2/21/2018  8:23 AM

## 2018-03-13 ENCOUNTER — OFFICE VISIT (OUTPATIENT)
Dept: HEMATOLOGY | Age: 68
End: 2018-03-13

## 2018-03-13 VITALS
OXYGEN SATURATION: 97 % | WEIGHT: 192 LBS | TEMPERATURE: 96 F | RESPIRATION RATE: 18 BRPM | DIASTOLIC BLOOD PRESSURE: 67 MMHG | HEART RATE: 80 BPM | SYSTOLIC BLOOD PRESSURE: 185 MMHG | HEIGHT: 69 IN | BODY MASS INDEX: 28.44 KG/M2

## 2018-03-13 DIAGNOSIS — R74.8 ELEVATED LIVER ENZYMES: Primary | ICD-10-CM

## 2018-03-13 PROBLEM — E11.21 TYPE 2 DIABETES WITH NEPHROPATHY (HCC): Status: ACTIVE | Noted: 2018-03-13

## 2018-03-13 NOTE — MR AVS SNAPSHOT
[address] [address] 
[phone] Patient: Ida Thibodeaux MRN: [id number] ZUB:[birth year] Visit Information Date & Time Provider Department Dept. Phone Encounter #  
 3/13/2018  9:30 AM Arden Fraga MD [facility] of  [address] [id number] Follow-up Instructions Return in about 4 weeks (around 4/10/2018) for MLS. Upcoming Health Maintenance Date Due MICROALBUMIN Q1 6/8/1962 EYE EXAM RETINAL OR DILATED Q1 6/8/1962 DTaP/Tdap/Td series (1 - Tdap) 6/8/1973 FOBT Q 1 YEAR AGE 50-75 6/8/2002 ZOSTER VACCINE AGE 60> 4/8/2012 FOOT EXAM Q1 6/9/2016 GLAUCOMA SCREENING Q2Y 6/8/2017 Bone Densitometry (Dexa) Screening 6/8/2017 Pneumococcal 65+ Low/Medium Risk (1 of 2 - PCV13) 6/8/2017 MEDICARE YEARLY EXAM 6/8/2017 Influenza Age 5 to Adult 8/1/2017 HEMOGLOBIN A1C Q6M 5/20/2018 LIPID PANEL Q1 11/20/2018 Allergies as of 3/13/2018  Review Complete On: 3/13/2018 By: Jennifer Maldonado Severity Noted Reaction Type Reactions Amoxicillin  06/04/2015    Rash Levaquin [Levofloxacin]  06/04/2015    Rash Current Immunizations  Never Reviewed No immunizations on file. Not reviewed this visit You Were Diagnosed With   
  
 Codes Comments Elevated liver enzymes    -  Primary ICD-10-CM: R74.8 ICD-9-CM: 790.5 Vitals BP Pulse Temp Resp Height(growth percentile) Weight(growth percentile) 185/67 (BP 1 Location: Left arm, BP Patient Position: Sitting) 80 96 °F (35.6 °C) (Tympanic) 18 5' 9\" (1.753 m) 192 lb (87.1 kg) SpO2 BMI Smoking Status 97% 28.35 kg/m2 Former Smoker Vitals History BMI and BSA Data Body Mass Index Body Surface Area  
 28.35 kg/m 2 2.06 m 2 Preferred Pharmacy Pharmacy Name Phone  [address] [facility] [phone] Your Updated Medication List  
  
   
This list is accurate as of 3/13/18  9:35 AM.  Always use your most recent med list.  
  
  
  
  
 amitriptyline 25 mg tablet Commonly known as:  ELAVIL Take 25 mg by mouth nightly. aspirin 81 mg chewable tablet Take 1 Tab by mouth daily. dicloxacillin 500 mg capsule Commonly known as:  Alvarado Rowee Take  by mouth two (2) times a day. * HumuLIN 70/30 U-100 Insulin 100 unit/mL (70-30) injection Generic drug:  insulin NPH/insulin regular 15 Units by SubCUTAneous route Daily (before dinner). * HumuLIN 70/30 U-100 Insulin 100 unit/mL (70-30) injection Generic drug:  insulin NPH/insulin regular 20 Units by SubCUTAneous route daily (with breakfast). LASIX 40 mg tablet Generic drug:  furosemide Take 80 mg by mouth daily as needed. LIPITOR 40 mg tablet Generic drug:  atorvastatin Take  by mouth daily. LYRICA 100 mg capsule Generic drug:  pregabalin Take 100 mg by mouth three (3) times daily. oxyCODONE IR 5 mg immediate release tablet Commonly known as:  Andrae Fuelling Take 7.5 mg by mouth three (3) times daily as needed. PLAVIX 75 mg Tab Generic drug:  clopidogrel Take 75 mg by mouth daily. promethazine 12.5 mg tablet Commonly known as:  PHENERGAN Take 12.5 mg by mouth every six (6) hours as needed. tamsulosin 0.4 mg capsule Commonly known as:  FLOMAX Take 0.4 mg by mouth daily. * Notice: This list has 2 medication(s) that are the same as other medications prescribed for you. Read the directions carefully, and ask your doctor or other care provider to review them with you. Follow-up Instructions Return in about 4 weeks (around 4/10/2018) for MLS. To-Do List   
 03/13/2018 Lab:  ANTI-NEUTROPHIL CYTOPLASMIC AB   
  
 03/13/2018 Lab:  CBC WITH AUTOMATED DIFF   
  
 03/13/2018 Lab:  HEPATIC FUNCTION PANEL   
  
 03/13/2018 Lab:  METABOLIC PANEL, BASIC   
  
 03/13/2018 Lab:  MITOCHONDRIAL M2 AB   
  
 04/12/2018 Imaging:  MRI ABD W MRCP W WO CONT Introducing [facility]! Lissette Muniz introduces [employer] patient portal. Now you can access parts of your medical record, email your doctor's office, and request medication refills online. 1. In your internet browser, go to [email or web address] 2. Click on the First Time User? Click Here link in the Sign In box. You will see the New Member Sign Up page. 3. Enter your Sentient Access Code exactly as it appears below. You will not need to use this code after youve completed the sign-up process. If you do not sign up before the expiration date, you must request a new code. · [employer]ient Access Code: [email or web address] Expires: 5/21/2018  5:29 PM 
 
4. Enter the last four digits of your Social Security Number (xxxx) and Date of Birth (mm/dd/yyyy) as indicated and click Submit. You will be taken to the next sign-up page. 5. Create a Sentient ID. This will be your Sentient login ID and cannot be changed, so think of one that is secure and easy to remember. 6. Create a Sentient password. You can change your password at any time. 7. Enter your Password Reset Question and Answer. This can be used at a later time if you forget your password. 8. Enter your e-mail address. You will receive e-mail notification when new information is available in [address] 9. Click Sign Up. You can now view and download portions of your medical record. 10. Click the Download Summary menu link to download a portable copy of your medical information. If you have questions, please visit the Frequently Asked Questions section of the Sentient website. Remember, Sentient is NOT to be used for urgent needs. For medical emergencies, dial 911. Now available from your iPhone and Android! Please provide this summary of care documentation to your next provider. Your primary care clinician is listed as [facility] . If you have any questions after today's visit, please call [phone].

## 2018-03-13 NOTE — PROGRESS NOTES
1. Have you been to the ER, urgent care clinic since your last visit? Hospitalized since your last visit? No    2. Have you seen or consulted any other health care providers outside of the [address] [place] [address] since your last visit? Include any pap smears or colon screening.  No

## 2018-03-15 LAB
ALBUMIN SERPL-MCNC: 3.2 G/DL (ref 3.6–4.8)
ALP SERPL-CCNC: 217 IU/L (ref 39–117)
ALT SERPL-CCNC: 27 IU/L (ref 0–44)
AST SERPL-CCNC: 31 IU/L (ref 0–40)
BASOPHILS # BLD AUTO: 0 X10E3/UL (ref 0–0.2)
BASOPHILS NFR BLD AUTO: 0 %
BILIRUB DIRECT SERPL-MCNC: 0.07 MG/DL (ref 0–0.4)
BILIRUB SERPL-MCNC: 0.3 MG/DL (ref 0–1.2)
BUN SERPL-MCNC: 22 MG/DL (ref 8–27)
BUN/CREAT SERPL: 15 (ref 10–24)
C-ANCA TITR SER IF: NORMAL TITER
CALCIUM SERPL-MCNC: 8.5 MG/DL (ref 8.6–10.2)
CHLORIDE SERPL-SCNC: 117 MMOL/L (ref 96–106)
CO2 SERPL-SCNC: 20 MMOL/L (ref 18–29)
CREAT SERPL-MCNC: 1.46 MG/DL (ref 0.76–1.27)
EOSINOPHIL # BLD AUTO: 0.1 X10E3/UL (ref 0–0.4)
EOSINOPHIL NFR BLD AUTO: 3 %
ERYTHROCYTE [DISTWIDTH] IN BLOOD BY AUTOMATED COUNT: 15 % (ref 12.3–15.4)
GFR SERPLBLD CREATININE-BSD FMLA CKD-EPI: 50 ML/MIN/1.73
GFR SERPLBLD CREATININE-BSD FMLA CKD-EPI: 58 ML/MIN/1.73
GLUCOSE SERPL-MCNC: 109 MG/DL (ref 65–99)
HCT VFR BLD AUTO: 35 % (ref 37.5–51)
HGB BLD-MCNC: 11.2 G/DL (ref 13–17.7)
IMM GRANULOCYTES # BLD: 0 X10E3/UL (ref 0–0.1)
IMM GRANULOCYTES NFR BLD: 0 %
LYMPHOCYTES # BLD AUTO: 0.8 X10E3/UL (ref 0.7–3.1)
LYMPHOCYTES NFR BLD AUTO: 26 %
MCH RBC QN AUTO: 29.3 PG (ref 26.6–33)
MCHC RBC AUTO-ENTMCNC: 32 G/DL (ref 31.5–35.7)
MCV RBC AUTO: 92 FL (ref 79–97)
MITOCHONDRIA M2 IGG SER-ACNC: 3.6 UNITS (ref 0–20)
MONOCYTES # BLD AUTO: 0.3 X10E3/UL (ref 0.1–0.9)
MONOCYTES NFR BLD AUTO: 8 %
MORPHOLOGY BLD-IMP: ABNORMAL
NEUTROPHILS # BLD AUTO: 2 X10E3/UL (ref 1.4–7)
NEUTROPHILS NFR BLD AUTO: 63 %
P-ANCA ATYPICAL TITR SER IF: NORMAL TITER
P-ANCA TITR SER IF: NORMAL TITER
PLATELET # BLD AUTO: 79 X10E3/UL (ref 150–379)
POTASSIUM SERPL-SCNC: 5.5 MMOL/L (ref 3.5–5.2)
PROT SERPL-MCNC: 6 G/DL (ref 6–8.5)
RBC # BLD AUTO: 3.82 X10E6/UL (ref 4.14–5.8)
SODIUM SERPL-SCNC: 148 MMOL/L (ref 134–144)
WBC # BLD AUTO: 3.2 X10E3/UL (ref 3.4–10.8)

## 2018-04-07 NOTE — PROGRESS NOTES
[address] MD Juan, [address], [place], [place]       TARUN Mcmillan PA-C Jenita Colonel, [facility]   [facility] [place], NP Florine Merlin, NP        at [address], [address], [place]  22.     [phone]     FAX: [phone]    at [address], [address]     [phone]     FAX: [phone]         Patient Care Team:  Florence Cheema MD as PCP - General ([facility])      Problem List  Date Reviewed: 4/7/2018          Codes Class Noted    Type 2 diabetes with nephropathy ([facility] 75.) ICD-10-CM: E11.21  ICD-9-CM: 250.40, 583.81  3/13/2018        H/O aortic valve replacement ICD-10-CM: Z95.2  ICD-9-CM: V43.3  11/20/2017        S/P placement of cardiac pacemaker ICD-10-CM: Z95.0  ICD-9-CM: V45.01  11/20/2017        S/P femoral-popliteal bypass surgery ICD-10-CM: A53.194  ICD-9-CM: V45.89  11/20/2017        Peripheral vascular disease ([facility] 75.) ICD-10-CM: I73.9  ICD-9-CM: 443.9  11/20/2017        CAD (coronary artery disease) ICD-10-CM: I25.10  ICD-9-CM: 414.00  11/20/2017        H/O arthroscopic knee surgery ICD-10-CM: Z98.890  ICD-9-CM: V45.89  11/20/2017        Elevated liver enzymes ICD-10-CM: R74.8  ICD-9-CM: 790.5  11/20/2017        Chronic kidney disease, stage III (moderate) ICD-10-CM: N18.3  ICD-9-CM: 585.3  6/16/2015        Type II diabetes mellitus ([email or web address].) ICD-10-CM: E11.9  ICD-9-CM: 250.00  6/16/2015        Hypertension ICD-10-CM: I10  ICD-9-CM: 401.9  6/16/2015        CHF (congestive heart failure) ([email or web address].) ICD-10-CM: I50.9  ICD-9-CM: 428.0  6/4/2015                Bhavya Wong returns to the [place] [address] for management of elevated alkaline phosphatase.  The active problem list, all pertinent past medical history, medications, radiologic findings and laboratory findings related to the liver disorder were reviewed with the patient. The patient is a 74 y.o.  male who was found to have abnormalities in liver transaminases and alkaline phosphate in 2/2017. Serologic evaluation for markers of chronic liver disease were positive for ASMA. Ultrasound of the liver was performed in 9/2017. The results of the imaging suggested chronic liver disease. Assessment of liver fibrosis was performed with sheer wave elastogrphy at THE [facility] in 12/2017. The result was 7.5 kPa which correlates with stage 2 fibrosis. The patient underwent a liver biopsy in 2/2018. The procedure was well tolerated. I have personally reviewed the liver biopsy slides. This demonstrates mild portal inflammation with focal bile duct injury. The most recent laboratory studies indicate that the liver transaminases are elevated, ALP is elevated, tests of hepatic synthetic and metabolic function are normal, and the platelet count is depressed. The patient has no symptoms which could be attributed to the liver disorder. The patient completes all daily activities without any functional limitations. The patient has not experienced fatigue, pain in the right side over the liver,     All of the issues listed in the Assessment and Plan were discussed with the patient. All questions were answered. The patient expressed a clear understanding of the above. [facility] 87 in 4 months for routine monitoring. ASSESSMENT AND PLAN:  Persistent elevation in alkaline phosphate   The etiology for this is not defined. Serologic studies are positive for ASMA  The liver biopsy demonstrates mild portal inflammaiton and some focal bile duct injury. This could be due to [facility], small duct PSC or PBC. The degree of fibrosis by liver biopsy is mild, only stage 1.   The degree of fibrosis estimated by liver stiffness and elastography is also mild with 7.4 kPa    Will perform imaging of the liver with MRI and MRCP.    Although he has features of metabolic syndrome he does not have NAFLD  No treatment for the elevated ALP is needed at this time  Elastography can be repeated annually to assess for fibrosis progression and need for treatment of the liver disorder. Low serum albumin   This is probably secondary to renal disease and is unlikely to reflect more advanced liver disease. Treatment of other medical problems in patients with chronic liver disease  There are no contraindications for the patient to take any medications that are necessary for treatment of other medical issues. The patient can take oral diabetic agents for treatment of diabetes and statins for treatment of hypercholesterolemia. This will not impact the patient's current liver disease. The patient does not consume alcohol daily. Normal doses of acetaminophen can be used for pain as needed. Normal doses of acetaminophen as recommended on the label are not hepatotoxic, even in patients with cirrhosis. The patient does not have cirrhosis. Normal doses of NSAIDs can be used for pain as needed. Counseling for alcohol in patients with chronic liver disease  The patient was counseled regarding alcohol consumption and the effect of alcohol on chronic liver disease. The patient has not consumed alcohol since 1990s. Vaccinations   Vaccination for viral hepatitis A is recommended since the patient has no serologic evidence of previous exposure or vaccination with immunity. Routine vaccinations against other bacterial and viral agents can be performed as indicated. Annual flu vaccination should be administered if indicated. ALLERGIES  Allergies   Allergen Reactions    Amoxicillin Rash    Levaquin [Levofloxacin] Rash       MEDICATIONS  Current Outpatient Prescriptions   Medication Sig    insulin NPH/insulin regular (HUMULIN 70/30 U-100 INSULIN) 100 unit/mL (70-30) injection 20 Units by SubCUTAneous route daily (with breakfast).     amitriptyline (ELAVIL) 25 mg tablet Take 25 mg by mouth nightly.  oxyCODONE IR (ROXICODONE) 5 mg immediate release tablet Take 7.5 mg by mouth three (3) times daily as needed.  promethazine (PHENERGAN) 12.5 mg tablet Take 12.5 mg by mouth every six (6) hours as needed.  clopidogrel (PLAVIX) 75 mg tab Take 75 mg by mouth daily.  pregabalin (LYRICA) 100 mg capsule Take 100 mg by mouth three (3) times daily.  insulin NPH/insulin regular (HUMULIN 70/30) 100 unit/mL (70-30) injection 15 Units by SubCUTAneous route Daily (before dinner).  furosemide (LASIX) 40 mg tablet Take 80 mg by mouth daily as needed.  tamsulosin (FLOMAX) 0.4 mg capsule Take 0.4 mg by mouth daily.  dicloxacillin (DYNAPEN) 500 mg capsule Take  by mouth two (2) times a day.  atorvastatin (LIPITOR) 40 mg tablet Take  by mouth daily.  aspirin 81 mg chewable tablet Take 1 Tab by mouth daily. No current facility-administered medications for this visit. SYSTEM REVIEW NOT RELATED TO LIVER DISEASE OR REVIEWED ABOVE:  Constitution systems: Negative for fever, chills, weight gain, weight loss. Eyes: Negative for visual changes. ENT: Negative for sore throat, painful swallowing. Respiratory: Negative for cough, hemoptysis, SOB. Cardiology: Negative for chest pain, palpitations. GI:  Negative for constipation or diarrhea. : Negative for urinary frequency, dysuria, hematuria, nocturia. Skin: Negative for rash. Hematology: Negative for easy bruising, blood clots. Musculo-skelatal: Negative for back pain, muscle pain, weakness. Neurologic: Negative for headaches, dizziness, vertigo, memory problems not related to HE. Psychology: Negative for anxiety, depression. FAMILY HISTORY:  The father  of alcohol cirrhosis. The mother is alive and healthy at age 80 years. There is no family history of liver disease. SOCIAL HISTORY:  The patient is . The patient has no children.      The patient stopped using tobacco products in 6/2012. The patient has previously consumed alcohol socially never in excess. The patient has been abstinent from alcohol since 1990s. The patient used to work as a . The patient retired in 2005. PHYSICAL EXAMINATION:  Visit Vitals    /67 (BP 1 Location: Left arm, BP Patient Position: Sitting)    Pulse 80    Temp 96 °F (35.6 °C) (Tympanic)    Resp 18    Ht 5' 9\" (1.753 m)    Wt 192 lb (87.1 kg)    SpO2 97%    BMI 28.35 kg/m2     General: No acute distress. Eyes: Sclera anicteric. ENT: No oral lesions. Thyroid normal.  Nodes: No adenopathy. Skin: No spider angiomata. No jaundice. No palmar erythema. Respiratory: Lungs clear to auscultation. Cardiovascular: Regular heart rate. No murmurs. No JVD. Abdomen: Soft non-tender. Liver size normal to percussion/palpation. Spleen not palpable. No obvious ascites. Extremities: No edema. No muscle wasting. No gross arthritic changes. Neurologic: Alert and oriented. Cranial nerves grossly intact. No asterixis.     LABORATORY STUDIES:  Liver [place] of [address] [place] [address] & Units 3/13/2018 11/20/2017   WBC 4.6 - 13.2 K/uL 3.2 (L) 3.6 (L)   ANC 1.8 - 8.0 K/UL 2.0 2.1   HGB 13.0 - 16.0 g/dL 11.2 (L) 11.0 (L)    - 420 K/uL 79 (LL) 88 (L)   INR 0.8 - 1.2    1.2   AST 15 - 37 U/L 31 35   ALT 16 - 61 U/L 27 49   Alk Phos 45 - 117 U/L 217 (H) 194 (H)   Bili, Total 0.2 - 1.0 MG/DL 0.3 0.4   Bili, Direct 0.0 - 0.2 MG/DL 0.07 0.1   Albumin 3.4 - 5.0 g/dL 3.2 (L) 3.2 (L)   BUN 7.0 - 18 MG/DL 22 44 (H)   Creat 0.6 - 1.3 MG/DL 1.46 (H) 2.19 (H)   Creat (iSTAT) 0.6 - 1.3 MG/DL     Na 136 - 145 mmol/L 148 (H) 143   K 3.5 - 5.5 mmol/L 5.5 (H) 5.9 (H)   Cl 100 - 108 mmol/L 117 (HH) 110 (H)   CO2 21 - 32 mmol/L 20 24   Glucose 74 - 99 mg/dL 109 (H) 183 (H)   Magnesium 1.8 - 2.4 mg/dL     Ammonia 11 - 32 UMOL/L         SEROLOGIES:  Serologies Latest Ref Rng & Units 11/20/2017   Hep A Ab, Total NEGATIVE   NEGATIVE   Hep B Surface Ag <1.00 Index <0.10   Hep B Surface Ag Interp NEG   NEGATIVE   Hep B Core Ab, Total NEGATIVE   NEGATIVE   Hep B Surface Ab >10.0 mIU/mL <3.10 (L)   Hep B Surface Ab Interp POS   NEGATIVE (A)   Hep C Ab 0.0 - 0.9 s/co ratio <0.1   Ferritin 8 - 388 NG/   Iron % Saturation % 33   MARIOLA, IFA  NEGATIVE   C-ANCA Neg:<1:20 titer    P-ANCA Neg:<1:20 titer    ANCA Neg:<1:20 titer    ASMCA 0 - 19 Units 24 (H)   M2 Ab 0.0 - 20.0 Units 3.7   Alpha-1 antitrypsin level 90 - 200 mg/dL 163     LIVER HISTOLOGY:  12/2017. Sheer wave elastography performed at [facility]. E Range: 3.0-9.6 kPa, E Mean: 7.3 kPa, E Median: 7.6 kPa, E Std: 2.1 kPa. The results suggested a fibrosis level of F2.  2/2018. Slides reviewed by MLS. Mild portal inflammation, and mild bile duct injury. No lobular inflammation. Stage 1 fibrosis. Knodell score (0011), Ayana score 2, Metavir score 1. ENDOSCOPIC PROCEDURES:  Not available or performed    RADIOLOGY:  12/2017. Ultrasound of liver. Echogenic consistent with chronic liver disease. No liver mass lesions. No dilated bile ducts. No ascites.     OTHER TESTING:  Not available or performed      [facility]  [address], [address] [place] [address], [address] [place] [address] Street - [address] [phone]

## 2018-04-07 NOTE — PROGRESS NOTES
[address] Martinez MD, [place], [place], [place]       TARUN Mendoza PA-C Arnoldo Amsterdam, [facility]   TARNU Franks NP        at [address], [address], [facility].     [phone]     FAX: [phone]    at [facility]     [address], [address], [address]     [phone]     FAX: [phone]         Patient Care Team:  Eladio Tello MD as PCP - General (Family Practice)      Problem List  Date Reviewed: 4/7/2018          Codes Class Noted    Type 2 diabetes with nephropathy ([facility] 75.) ICD-10-CM: E11.21  ICD-9-CM: 250.40, 583.81  3/13/2018        H/O aortic valve replacement ICD-10-CM: Z95.2  ICD-9-CM: V43.3  11/20/2017        S/P placement of cardiac pacemaker ICD-10-CM: Z95.0  ICD-9-CM: V45.01  11/20/2017        S/P femoral-popliteal bypass surgery ICD-10-CM: X11.923  ICD-9-CM: V45.89  11/20/2017        Peripheral vascular disease ([facility].) ICD-10-CM: I73.9  ICD-9-CM: 443.9  11/20/2017        CAD (coronary artery disease) ICD-10-CM: I25.10  ICD-9-CM: 414.00  11/20/2017        H/O arthroscopic knee surgery ICD-10-CM: Z98.890  ICD-9-CM: V45.89  11/20/2017        Elevated liver enzymes ICD-10-CM: R74.8  ICD-9-CM: 790.5  11/20/2017        Chronic kidney disease, stage III (moderate) ICD-10-CM: N18.3  ICD-9-CM: 585.3  6/16/2015        Type II diabetes mellitus ([facility].) ICD-10-CM: E11.9  ICD-9-CM: 250.00  6/16/2015        Hypertension ICD-10-CM: I10  ICD-9-CM: 401.9  6/16/2015        CHF (congestive heart failure) ([facility] ICD-10-CM: I50.9  ICD-9-CM: 428.0  6/4/2015                Jonna Sanz returns to the [place] [address] for management of elevated alkaline phosphatase.  The active problem list, all pertinent past medical history, medications, radiologic findings and laboratory findings related to the liver disorder were reviewed with the patient. The patient is a 74 y.o.  male who was first noted to have abnormalities in liver transaminases and alkaline phosphate in 2/2017. Serologic evaluation for markers of chronic liver disease were positive for ASMA. Ultrasound of the liver was performed in 9/2017. The results of the imaging suggested chronic liver disease. Assessment of liver fibrosis was performed with sheer wave elastogrphy at [facility] in 12/2017. The result was 7.5 kPa which correlates with stage 2 fibrosis. The most recent laboratory studies indicate that the liver transaminases are elevated, ALP is elevated, tests of hepatic synthetic and metabolic function are normal, and the platelet count is depressed. The patient has no symptoms which could be attributed to the liver disorder. The patient completes all daily activities without any functional limitations. The patient has not experienced fatigue, pain in the right side over the liver,       ALLERGIES  Allergies   Allergen Reactions    Amoxicillin Rash    Levaquin [Levofloxacin] Rash       MEDICATIONS  Current Outpatient Prescriptions   Medication Sig    oxyCODONE IR (ROXICODONE) 5 mg immediate release tablet Take 7.5 mg by mouth three (3) times daily as needed.  promethazine (PHENERGAN) 12.5 mg tablet Take 12.5 mg by mouth every six (6) hours as needed.  clopidogrel (PLAVIX) 75 mg tab Take 75 mg by mouth daily.  insulin NPH/insulin regular (HUMULIN 70/30) 100 unit/mL (70-30) injection 15 Units by SubCUTAneous route Daily (before dinner).  furosemide (LASIX) 40 mg tablet Take 80 mg by mouth daily as needed.  tamsulosin (FLOMAX) 0.4 mg capsule Take 0.4 mg by mouth daily.  dicloxacillin (DYNAPEN) 500 mg capsule Take  by mouth two (2) times a day.  aspirin 81 mg chewable tablet Take 1 Tab by mouth daily.     insulin NPH/insulin regular (HUMULIN 70/30 U-100 INSULIN) 100 unit/mL (70-30) injection 20 Units by SubCUTAneous route daily (with breakfast).  amitriptyline (ELAVIL) 25 mg tablet Take 25 mg by mouth nightly.  pregabalin (LYRICA) 100 mg capsule Take 100 mg by mouth three (3) times daily.  atorvastatin (LIPITOR) 40 mg tablet Take  by mouth daily. No current facility-administered medications for this visit. SYSTEM REVIEW NOT RELATED TO LIVER DISEASE OR REVIEWED ABOVE:  Constitution systems: Negative for fever, chills, weight gain, weight loss. Eyes: Negative for visual changes. ENT: Negative for sore throat, painful swallowing. Respiratory: Negative for cough, hemoptysis, SOB. Cardiology: Negative for chest pain, palpitations. GI:  Negative for constipation or diarrhea. : Negative for urinary frequency, dysuria, hematuria, nocturia. Skin: Negative for rash. Hematology: Negative for easy bruising, blood clots. Musculo-skelatal: Negative for back pain, muscle pain, weakness. Neurologic: Negative for headaches, dizziness, vertigo, memory problems not related to HE. Psychology: Negative for anxiety, depression. FAMILY HISTORY:  The father  of alcohol cirrhosis. The mother is alive and healthy at age 80 years. There is no family history of liver disease. SOCIAL HISTORY:  The patient is . The patient has no children. The patient stopped using tobacco products in 2012. The patient has previously consumed alcohol socially never in excess. The patient has been abstinent from alcohol since . The patient used to work as a . The patient retired in . PHYSICAL EXAMINATION:  Visit Vitals    /54 (BP 1 Location: Right arm, BP Patient Position: Sitting)    Pulse 70    Temp 97 °F (36.1 °C) (Tympanic)    Resp 18    Ht 5' 8\" (1.727 m)    Wt 185 lb (83.9 kg)    SpO2 98%    BMI 28.13 kg/m2     General: No acute distress. Eyes: Sclera anicteric. ENT: No oral lesions. Thyroid normal.  Nodes: No adenopathy.    Skin: No spider angiomata. No jaundice. No palmar erythema. Respiratory: Lungs clear to auscultation. Cardiovascular: Regular heart rate. No murmurs. No JVD. Abdomen: Soft non-tender. Liver size normal to percussion/palpation. Spleen not palpable. No obvious ascites. Extremities: No edema. No muscle wasting. No gross arthritic changes. Neurologic: Alert and oriented. Cranial nerves grossly intact. No asterixis. LABORATORY STUDIES:  Liver [place] of [address] Units 11/20/2017   WBC 3.4 - 10.8 x10E3/uL 3.6 (L)   ANC 1.4 - 7.0 x10E3/uL 2.1   HGB 13.0 - 17.7 g/dL 11.0 (L)    - 379 x10E3/uL 88 (L)   INR 0.8 - 1.2   1.2   AST 0 - 40 IU/L 35   ALT 0 - 44 IU/L 49   Alk Phos 39 - 117 IU/L 194 (H)   Bili, Total 0.0 - 1.2 mg/dL 0.4   Bili, Direct 0.00 - 0.40 mg/dL 0.1   Albumin 3.6 - 4.8 g/dL 3.2 (L)   BUN 8 - 27 mg/dL 44 (H)   Creat 0.76 - 1.27 mg/dL 2.19 (H)   Na 134 - 144 mmol/L 143   K 3.5 - 5.2 mmol/L 5.9 (H)   Cl 96 - 106 mmol/L 110 (H)   CO2 18 - 29 mmol/L 24   Glucose 65 - 99 mg/dL 183 (H)   Magnesium 1.8 - 2.4 mg/dL    Ammonia 11 - 32 UMOL/L      SEROLOGIES:  Serologies Latest Ref Rng & Units 11/20/2017   Hep A Ab, Total NEGATIVE   NEGATIVE   Hep B Surface Ag <1.00 Index <0.10   Hep B Surface Ag Interp NEG   NEGATIVE   Hep B Core Ab, Total NEGATIVE   NEGATIVE   Hep B Surface Ab >10.0 mIU/mL <3.10 (L)   Hep B Surface Ab Interp POS   NEGATIVE (A)   Hep C Ab 0.0 - 0.9 s/co ratio <0.1   Ferritin 8 - 388 NG/   Iron % Saturation % 33   MARIOLA, IFA  NEGATIVE   C-ANCA Neg:<1:20 titer    P-ANCA Neg:<1:20 titer    ANCA Neg:<1:20 titer    ASMCA 0 - 19 Units 24 (H)   M2 Ab 0.0 - 20.0 Units 3.7   Alpha-1 antitrypsin level 90 - 200 mg/dL 163     LIVER HISTOLOGY:  12/2017. Sheer wave elastography performed at [facility]. E Range: 3.0-9.6 kPa, E Mean: 7.3 kPa, E Median: 7.6 kPa, E Std: 2.1 kPa.   The results suggested a fibrosis level of F2.    ENDOSCOPIC PROCEDURES:  Not available or performed    RADIOLOGY:  Not available or performed    OTHER TESTING:  Not available or performed    ASSESSMENT AND PLAN:  Persistent elevation in alkaline phosphate of unclear etiology at this time. The serum albumin is reduced. The platelet count is depressed. The patient has CKD that is probably fom DM and heart disease. The low serum albumin may be from urine protein loss. Based upon laboratory studies, elastography and imaging the patient may have significant liver injury. Serologic testing to help define the cause of the laboratory abnormality were ordered. Results were positive for ASMA. Will perform additional serologic tests    The most likely causes for the liver chemistry abnormalities were discussed with the patient and include immune liver disorders,     Will perform laboratory testing to monitor liver function and degree of liver injury. This included BMP, hepatic panel, CBC with platelet count, INR. Will perform imaging of the liver with ultrasound. The need to perform a liver biopsy to help determine the cause and severity of the liver test abnormalities was discussed. The risks of performing the liver biopsy including pain, puncture of the lung, gallbladder, intestine or kidney and bleeding were discussed. Will proceed with liver biopsy at this time. The patient was directed to continue all current medications at the current dosages. There are no contraindications for the patient to take any medications that are necessary for treatment of other medical issues. The patient was counseled regarding alcohol consumption. Vaccination for viral hepatitis A is recommended since the patient has no serologic evidence of previous exposure or vaccination with immunity. All of the above issues were discussed with the patient. All questions were answered. The patient expressed a clear understanding of the above.     [address] [facility] 87 in 2 weeks after liver biopsy.     Gabriela Sevilla MD  Liver [place] of [address], [address] [facility], [facility] [address]  [phone]

## 2018-04-10 ENCOUNTER — TELEPHONE (OUTPATIENT)
Dept: HEMATOLOGY | Age: 68
End: 2018-04-10

## 2018-04-10 NOTE — TELEPHONE ENCOUNTER
Pt states he was suppose to get MRI before his next appt on 4/18/18. Pt has a pacemaker so they can not do the MRI here at [facility], pt could have procedure done in [place] but states that is too far for him to travel.   Would like to know what he should do

## 2018-04-26 ENCOUNTER — TELEPHONE (OUTPATIENT)
Dept: HEMATOLOGY | Age: 68
End: 2018-04-26

## 2018-04-26 NOTE — TELEPHONE ENCOUNTER
Patient's primary care (Kerri Nobles) called to inform Dr. Eveline Doran and his nurse that the patient can't get an MRI due to the pacemaker that he has. Informed Yury Jarrett that I will send a message to Dr. Eveline Doran as well as his nurse. She confirmed understanding.

## 2018-05-03 DIAGNOSIS — B18.2 CHRONIC HEPATITIS C WITHOUT HEPATIC COMA (HCC): ICD-10-CM

## 2018-05-03 DIAGNOSIS — R74.8 ELEVATED ALKALINE PHOSPHATASE LEVEL: Primary | ICD-10-CM

## 2018-05-17 ENCOUNTER — HOSPITAL ENCOUNTER (OUTPATIENT)
Dept: CT IMAGING | Age: 68
Discharge: HOME | End: 2018-05-17
Attending: INTERNAL MEDICINE
Payer: MEDICARE

## 2018-05-17 DIAGNOSIS — R74.8 ELEVATED ALKALINE PHOSPHATASE LEVEL: ICD-10-CM

## 2018-05-17 LAB — CREAT UR-MCNC: 1.9 MG/DL (ref 0.6–1.3)

## 2018-05-17 PROCEDURE — 74170 CT ABD WO CNTRST FLWD CNTRST: CPT

## 2018-05-17 PROCEDURE — 82565 ASSAY OF CREATININE: CPT

## 2018-05-17 PROCEDURE — 74011636320 HC RX REV CODE- 636/320: Performed by: INTERNAL MEDICINE

## 2018-05-17 PROCEDURE — 74011000255 HC RX REV CODE- 255: Performed by: INTERNAL MEDICINE

## 2018-05-17 RX ORDER — BARIUM SULFATE 20 MG/ML
900 SUSPENSION ORAL
Status: COMPLETED | OUTPATIENT
Start: 2018-05-17 | End: 2018-05-17

## 2018-05-17 RX ADMIN — IOPAMIDOL 100 ML: 612 INJECTION, SOLUTION INTRAVENOUS at 08:18

## 2018-05-17 RX ADMIN — BARIUM SULFATE 900 ML: 20 SUSPENSION ORAL at 08:18

## 2018-07-02 ENCOUNTER — OFFICE VISIT (OUTPATIENT)
Dept: HEMATOLOGY | Age: 68
End: 2018-07-02

## 2018-07-02 VITALS
WEIGHT: 190.38 LBS | OXYGEN SATURATION: 96 % | TEMPERATURE: 98.8 F | HEIGHT: 69 IN | BODY MASS INDEX: 28.2 KG/M2 | DIASTOLIC BLOOD PRESSURE: 60 MMHG | SYSTOLIC BLOOD PRESSURE: 158 MMHG | HEART RATE: 70 BPM

## 2018-07-02 DIAGNOSIS — R74.8 ELEVATED ALKALINE PHOSPHATASE LEVEL: Primary | ICD-10-CM

## 2018-07-02 NOTE — MR AVS SNAPSHOT
Rajwinder Milton 
 
 
 [address] [place] [address] 
[phone] Patient: Francine Villafana MRN: [id number] PEEWEE:[birth year] Visit Information Date & Time Provider Department Dept. Phone Encounter #  
 7/2/2018  4:30 PM Delfino Estrada MD [address] [id number] Follow-up Instructions Return in about 4 months (around 11/2/2018) for NP. Upcoming Health Maintenance Date Due MICROALBUMIN Q1 6/8/1962 EYE EXAM RETINAL OR DILATED Q1 6/8/1962 DTaP/Tdap/Td series (1 - Tdap) 6/8/1973 FOBT Q 1 YEAR AGE 50-75 6/8/2002 ZOSTER VACCINE AGE 60> 4/8/2012 FOOT EXAM Q1 6/9/2016 GLAUCOMA SCREENING Q2Y 6/8/2017 Pneumococcal 65+ Low/Medium Risk (1 of 2 - PCV13) 6/8/2017 HEMOGLOBIN A1C Q6M 5/20/2018 Influenza Age 5 to Adult 8/1/2018 LIPID PANEL Q1 11/20/2018 Allergies as of 7/2/2018  Review Complete On: 4/7/2018 By: Delfino Estrada MD  
  
 Severity Noted Reaction Type Reactions Amoxicillin  06/04/2015    Rash Levaquin [Levofloxacin]  06/04/2015    Rash Current Immunizations  Never Reviewed No immunizations on file. Not reviewed this visit You Were Diagnosed With   
  
 Codes Comments Elevated alkaline phosphatase level    -  Primary ICD-10-CM: R74.8 ICD-9-CM: 790.5 Vitals BP Pulse Temp Height(growth percentile) Weight(growth percentile) SpO2  
 158/60 70 98.8 °F (37.1 °C) (Tympanic) 5' 9\" (1.753 m) 190 lb 6 oz (86.4 kg) 96% BMI Smoking Status 28.11 kg/m2 Former Smoker Vitals History BMI and BSA Data Body Mass Index Body Surface Area  
 28.11 kg/m 2 2.05 m 2 Preferred Pharmacy Pharmacy Name Phone [facility] [address] [phone] Your Updated Medication List  
  
   
 This list is accurate as of 7/2/18  4:32 PM.  Always use your most recent med list.  
  
  
  
  
 amitriptyline 25 mg tablet Commonly known as:  ELAVIL Take 25 mg by mouth nightly. aspirin 81 mg chewable tablet Take 1 Tab by mouth daily. dicloxacillin 500 mg capsule Commonly known as:  Carissa Bustos Take  by mouth two (2) times a day. * HumuLIN 70/30 U-100 Insulin 100 unit/mL (70-30) injection Generic drug:  insulin NPH/insulin regular 15 Units by SubCUTAneous route Daily (before dinner). * HumuLIN 70/30 U-100 Insulin 100 unit/mL (70-30) injection Generic drug:  insulin NPH/insulin regular 20 Units by SubCUTAneous route daily (with breakfast). LASIX 40 mg tablet Generic drug:  furosemide Take 80 mg by mouth daily as needed. LIPITOR 40 mg tablet Generic drug:  atorvastatin Take  by mouth daily. LYRICA 100 mg capsule Generic drug:  pregabalin Take 100 mg by mouth three (3) times daily. oxyCODONE IR 5 mg immediate release tablet Commonly known as:  [place] Take 7.5 mg by mouth three (3) times daily as needed. PLAVIX 75 mg Tab Generic drug:  clopidogrel Take 75 mg by mouth daily. promethazine 12.5 mg tablet Commonly known as:  PHENERGAN Take 12.5 mg by mouth every six (6) hours as needed. tamsulosin 0.4 mg capsule Commonly known as:  FLOMAX Take 0.4 mg by mouth daily. * Notice: This list has 2 medication(s) that are the same as other medications prescribed for you. Read the directions carefully, and ask your doctor or other care provider to review them with you. Follow-up Instructions Return in about 4 months (around 11/2/2018) for NP. To-Do List   
 07/02/2018 Lab:  CBC WITH AUTOMATED DIFF   
  
 07/02/2018 Lab:  HEPATIC FUNCTION PANEL   
  
 07/02/2018 Lab:  METABOLIC PANEL, BASIC Introducing [facility]! [facility] introduces [employer] patient portal. Now you can access parts of your medical record, email your doctor's office, and request medication refills online. 1. In your internet browser, go to [email or web address] 2. Click on the First Time User? Click Here link in the Sign In box. You will see the New Member Sign Up page. 3. Enter your [employer] Access Code exactly as it appears below. You will not need to use this code after youve completed the sign-up process. If you do not sign up before the expiration date, you must request a new code. · [employer] Access Code: [email or web address] Expires: 9/18/2018  5:17 AM 
 
4. Enter the last four digits of your Social Security Number (xxxx) and Date of Birth (mm/dd/yyyy) as indicated and click Submit. You will be taken to the next sign-up page. 5. Create a [employer] ID. This will be your [employer] login ID and cannot be changed, so think of one that is secure and easy to remember. 6. Create a [employer] password. You can change your password at any time. 7. Enter your Password Reset Question and Answer. This can be used at a later time if you forget your password. 8. Enter your e-mail address. You will receive e-mail notification when new information is available in [address] 9. Click Sign Up. You can now view and download portions of your medical record. 10. Click the Download Summary menu link to download a portable copy of your medical information. If you have questions, please visit the Frequently Asked Questions section of the [employer] website. Remember, [employer] is NOT to be used for urgent needs. For medical emergencies, dial 911. Now available from your iPhone and Android! Please provide this summary of care documentation to your next provider. Your primary care clinician is listed as [address] If you have any questions after today's visit, please call [phone].

## 2018-07-02 NOTE — PROGRESS NOTES
[address] MD Juan, Randell Tamayo, Shelli Wanpriscilla Cha, [place]       Emogene Khoi, RENATO Berger, [facility]   Phuong Hollis, TARUN Barajas NP        at [address], [address], [place]  [address].     [phone]     FAX: [phone]    at [facility]     [address], [address], [address]     [phone]     FAX: [phone]         Patient Care Team:  Ramya Salvador MD as PCP - General ([facility])      Problem List  Date Reviewed: 8/12/2018          Codes Class Noted    Type 2 diabetes with nephropathy ([facility] ICD-10-CM: E11.21  ICD-9-CM: 250.40, 583.81  3/13/2018        H/O aortic valve replacement ICD-10-CM: Z95.2  ICD-9-CM: V43.3  11/20/2017        S/P placement of cardiac pacemaker ICD-10-CM: Z95.0  ICD-9-CM: V45.01  11/20/2017        S/P femoral-popliteal bypass surgery ICD-10-CM: H80.717  ICD-9-CM: V45.89  11/20/2017        Peripheral vascular disease ([facility] ICD-10-CM: I73.9  ICD-9-CM: 443.9  11/20/2017        CAD (coronary artery disease) ICD-10-CM: I25.10  ICD-9-CM: 414.00  11/20/2017        H/O arthroscopic knee surgery ICD-10-CM: Z98.890  ICD-9-CM: V45.89  11/20/2017        Elevated liver enzymes ICD-10-CM: R74.8  ICD-9-CM: 790.5  11/20/2017        Chronic kidney disease, stage III (moderate) ICD-10-CM: N18.3  ICD-9-CM: 585.3  6/16/2015        Type II diabetes mellitus ([facility] ICD-10-CM: E11.9  ICD-9-CM: 250.00  6/16/2015        Hypertension ICD-10-CM: I10  ICD-9-CM: 401.9  6/16/2015        CHF (congestive heart failure) ([facility] ICD-10-CM: I50.9  ICD-9-CM: 428.0  6/4/2015                Robbin Grey returns to the [place] [address] for management of elevated alkaline phosphatase.  The active problem list, all pertinent past medical history, medications, radiologic findings and laboratory findings related to the liver disorder were reviewed with the patient. The patient is a 79 y.o.  male who was found to have abnormalities in liver transaminases and alkaline phosphate in 2/2017. The patient has no symptoms which could be attributed to the liver disorder. The patient completes all daily activities without any functional limitations. The patient has not experienced fatigue, pain in the right side over the liver,     All of the issues listed in the Assessment and Plan were discussed with the patient. All questions were answered. The patient expressed a clear understanding of the above. [address] [place] [address] in 4 months for routine monitoring. ASSESSMENT AND PLAN:  Persistent elevation in alkaline phosphate   The etiology for this is not defined. Liver transaminases are normal.  ALP is elevated. Liver function is normal.  The platelet count is depressed. Serologic studies are positive for ASMA  The liver biopsy demonstrates mild portal inflammaiton and some focal bile duct injury. This could be due to [place], small duct PSC, or PBC     The degree of fibrosis by liver biopsy is mild, only stage 1. The degree of fibrosis estimated by liver stiffness and elastography is also mild with 7.4 kPa    Will perform imaging of the liver with MRI and MRCP. Although he has features of metabolic syndrome he does not have NAFLD  No treatment for the elevated ALP is needed at this time  Elastography can be repeated annually to assess for fibrosis progression and need for treatment of the liver disorder. Low serum albumin   This is probably secondary to renal disease and is unlikely to reflect more advanced liver disease. Thrombocytopenia   This is secondary to uncertain etiology. The platelet count is adequate for the patient to undergo procedures without the need for platelet transfusion or platelet growth factors.     Treatment of other medical problems in patients with chronic liver disease  There are no contraindications for the patient to take any medications that are necessary for treatment of other medical issues. The patient can take oral diabetic agents for treatment of diabetes and statins for treatment of hypercholesterolemia. This will not impact the patient's current liver disease. The patient does not consume alcohol daily. Normal doses of acetaminophen can be used for pain as needed. Normal doses of acetaminophen as recommended on the label are not hepatotoxic, even in patients with cirrhosis. The patient does not have cirrhosis. Normal doses of NSAIDs can be used for pain as needed. Counseling for alcohol in patients with chronic liver disease  The patient was counseled regarding alcohol consumption and the effect of alcohol on chronic liver disease. The patient has not consumed alcohol since 1990s. Vaccinations   Vaccination for viral hepatitis A is recommended since the patient has no serologic evidence of previous exposure or vaccination with immunity. Routine vaccinations against other bacterial and viral agents can be performed as indicated. Annual flu vaccination should be administered if indicated. ALLERGIES  Allergies   Allergen Reactions    Amoxicillin Rash    Levaquin [Levofloxacin] Rash       MEDICATIONS  Current Outpatient Prescriptions   Medication Sig    insulin NPH/insulin regular (HUMULIN 70/30 U-100 INSULIN) 100 unit/mL (70-30) injection 20 Units by SubCUTAneous route daily (with breakfast).  amitriptyline (ELAVIL) 25 mg tablet Take 25 mg by mouth nightly.  oxyCODONE IR (ROXICODONE) 5 mg immediate release tablet Take 7.5 mg by mouth three (3) times daily as needed.  promethazine (PHENERGAN) 12.5 mg tablet Take 12.5 mg by mouth every six (6) hours as needed.  clopidogrel (PLAVIX) 75 mg tab Take 75 mg by mouth daily.  pregabalin (LYRICA) 100 mg capsule Take 100 mg by mouth three (3) times daily.     insulin NPH/insulin regular (HUMULIN 70/30) 100 unit/mL (70-30) injection 15 Units by SubCUTAneous route Daily (before dinner).  furosemide (LASIX) 40 mg tablet Take 80 mg by mouth daily as needed.  tamsulosin (FLOMAX) 0.4 mg capsule Take 0.4 mg by mouth daily.  dicloxacillin (DYNAPEN) 500 mg capsule Take  by mouth two (2) times a day.  atorvastatin (LIPITOR) 40 mg tablet Take  by mouth daily.  aspirin 81 mg chewable tablet Take 1 Tab by mouth daily. No current facility-administered medications for this visit. SYSTEM REVIEW NOT RELATED TO LIVER DISEASE OR REVIEWED ABOVE:  Constitution systems: Negative for fever, chills, weight gain, weight loss. Eyes: Negative for visual changes. ENT: Negative for sore throat, painful swallowing. Respiratory: Negative for cough, hemoptysis, SOB. Cardiology: Negative for chest pain, palpitations. GI:  Negative for constipation or diarrhea. : Negative for urinary frequency, dysuria, hematuria, nocturia. Skin: Negative for rash. Hematology: Negative for easy bruising, blood clots. Musculo-skelatal: Negative for back pain, muscle pain, weakness. Neurologic: Negative for headaches, dizziness, vertigo, memory problems not related to HE. Psychology: Negative for anxiety, depression. FAMILY HISTORY:  The father  of alcohol cirrhosis. The mother is alive and healthy at age 80 years. There is no family history of liver disease. SOCIAL HISTORY:  The patient is . The patient has no children. The patient stopped using tobacco products in 2012. The patient has previously consumed alcohol socially never in excess. The patient has been abstinent from alcohol since . The patient used to work as a . The patient retired in .         PHYSICAL EXAMINATION:  Visit Vitals    /60    Pulse 70    Temp 98.8 °F (37.1 °C) (Tympanic)    Ht 5' 9\" (1.753 m)    Wt 190 lb 6 oz (86.4 kg)    SpO2 96%    BMI 28.11 kg/m2     General: No acute distress. Eyes: Sclera anicteric. ENT: No oral lesions. Thyroid normal.  Nodes: No adenopathy. Skin: No spider angiomata. No jaundice. No palmar erythema. Respiratory: Lungs clear to auscultation. Cardiovascular: Regular heart rate. No murmurs. No JVD. Abdomen: Soft non-tender. Liver size normal to percussion/palpation. Spleen not palpable. No obvious ascites. Extremities: No edema. No muscle wasting. No gross arthritic changes. Neurologic: Alert and oriented. Cranial nerves grossly intact. No asterixis. LABORATORY STUDIES:  Liver [place] [address] & Units 7/3/2018   WBC 4.6 - 13.2 K/uL 2.7 (L)   ANC 1.8 - 8.0 K/UL 1.7 (L)   HGB 13.0 - 16.0 g/dL 11.4 (L)    - 420 K/uL 67 (L)   INR 0.8 - 1.2      AST 15 - 37 U/L 29   ALT 16 - 61 U/L 38   Alk Phos 45 - 117 U/L 197 (H)   Bili, Total 0.2 - 1.0 MG/DL 0.4   Bili, Direct 0.0 - 0.2 MG/DL 0.1   Albumin 3.4 - 5.0 g/dL 3.1 (L)   BUN 7.0 - 18 MG/DL 31 (H)   Creat 0.6 - 1.3 MG/DL 1.89 (H)   Creat (iSTAT) 0.6 - 1.3 MG/DL    Na 136 - 145 mmol/L 144   K 3.5 - 5.5 mmol/L 5.8 (H)   Cl 100 - 108 mmol/L 114 (H)   CO2 21 - 32 mmol/L 22   Glucose 74 - 99 mg/dL 216 (H)   Magnesium 1.8 - 2.4 mg/dL    Ammonia 11 - 32 UMOL/L      SEROLOGIES:  Serologies Latest Ref Rng & Units 11/20/2017   Hep A Ab, Total NEGATIVE   NEGATIVE   Hep B Surface Ag <1.00 Index <0.10   Hep B Surface Ag Interp NEG   NEGATIVE   Hep B Core Ab, Total NEGATIVE   NEGATIVE   Hep B Surface Ab >10.0 mIU/mL <3.10 (L)   Hep B Surface Ab Interp POS   NEGATIVE (A)   Hep C Ab 0.0 - 0.9 s/co ratio <0.1   Ferritin 8 - 388 NG/   Iron % Saturation % 33   MARIOLA, IFA  NEGATIVE   C-ANCA Neg:<1:20 titer    P-ANCA Neg:<1:20 titer    ANCA Neg:<1:20 titer    ASMCA 0 - 19 Units 24 (H)   M2 Ab 0.0 - 20.0 Units 3.7   Alpha-1 antitrypsin level 90 - 200 mg/dL 163     LIVER HISTOLOGY:  12/2017. Sheer wave elastography performed at [facility].   E Range: 3.0-9.6 kPa, E Mean: 7.3 kPa, E Median: 7.6 kPa, E Std: 2.1 kPa. The results suggested a fibrosis level of F2.  2/2018. Slides reviewed by MLS. Mild portal inflammation, and mild bile duct injury. No lobular inflammation. Stage 1 fibrosis. Knodell score (0011), Ayana score 2, Metavir score 1. ENDOSCOPIC PROCEDURES:  Not available or performed    RADIOLOGY:  12/2017. Ultrasound of liver. Echogenic consistent with chronic liver disease. No liver mass lesions. No dilated bile ducts. No ascites. 5/2018. CT scan abdomen with and without IV contrast.  Normal appearing liver. No liver mass lesions. Normal spleen. No ascites.     OTHER TESTING:  Not available or performed      Darian Cruz MD  [facility] [address], [address] [place] [address], [address] [place] [address] - [address] [phone]

## 2018-07-03 ENCOUNTER — HOSPITAL ENCOUNTER (OUTPATIENT)
Dept: LAB | Age: 68
Discharge: HOME | End: 2018-07-03
Payer: MEDICARE

## 2018-07-03 DIAGNOSIS — R74.8 ELEVATED ALKALINE PHOSPHATASE LEVEL: ICD-10-CM

## 2018-07-03 LAB
ALBUMIN SERPL-MCNC: 3.1 G/DL (ref 3.4–5)
ALBUMIN/GLOB SERPL: 0.9 {RATIO} (ref 0.8–1.7)
ALP SERPL-CCNC: 197 U/L (ref 45–117)
ALT SERPL-CCNC: 38 U/L (ref 16–61)
ANION GAP SERPL CALC-SCNC: 8 MMOL/L (ref 3–18)
AST SERPL-CCNC: 29 U/L (ref 15–37)
BASOPHILS # BLD: 0 K/UL (ref 0–0.06)
BASOPHILS NFR BLD: 0 % (ref 0–2)
BILIRUB DIRECT SERPL-MCNC: 0.1 MG/DL (ref 0–0.2)
BILIRUB SERPL-MCNC: 0.4 MG/DL (ref 0.2–1)
BUN SERPL-MCNC: 31 MG/DL (ref 7–18)
BUN/CREAT SERPL: 16 (ref 12–20)
CALCIUM SERPL-MCNC: 8.3 MG/DL (ref 8.5–10.1)
CHLORIDE SERPL-SCNC: 114 MMOL/L (ref 100–108)
CO2 SERPL-SCNC: 22 MMOL/L (ref 21–32)
CREAT SERPL-MCNC: 1.89 MG/DL (ref 0.6–1.3)
DIFFERENTIAL METHOD BLD: ABNORMAL
EOSINOPHIL # BLD: 0.1 K/UL (ref 0–0.4)
EOSINOPHIL NFR BLD: 3 % (ref 0–5)
ERYTHROCYTE [DISTWIDTH] IN BLOOD BY AUTOMATED COUNT: 14.7 % (ref 11.6–14.5)
GLOBULIN SER CALC-MCNC: 3.3 G/DL (ref 2–4)
GLUCOSE SERPL-MCNC: 216 MG/DL (ref 74–99)
HCT VFR BLD AUTO: 35.6 % (ref 36–48)
HGB BLD-MCNC: 11.4 G/DL (ref 13–16)
LYMPHOCYTES # BLD: 0.8 K/UL (ref 0.9–3.6)
LYMPHOCYTES NFR BLD: 29 % (ref 21–52)
MCH RBC QN AUTO: 29.7 PG (ref 24–34)
MCHC RBC AUTO-ENTMCNC: 32 G/DL (ref 31–37)
MCV RBC AUTO: 92.7 FL (ref 74–97)
MONOCYTES # BLD: 0.2 K/UL (ref 0.05–1.2)
MONOCYTES NFR BLD: 7 % (ref 3–10)
NEUTS SEG # BLD: 1.7 K/UL (ref 1.8–8)
NEUTS SEG NFR BLD: 61 % (ref 40–73)
PLATELET # BLD AUTO: 67 K/UL (ref 135–420)
PMV BLD AUTO: 13.4 FL (ref 9.2–11.8)
POTASSIUM SERPL-SCNC: 5.8 MMOL/L (ref 3.5–5.5)
PROT SERPL-MCNC: 6.4 G/DL (ref 6.4–8.2)
RBC # BLD AUTO: 3.84 M/UL (ref 4.7–5.5)
SODIUM SERPL-SCNC: 144 MMOL/L (ref 136–145)
WBC # BLD AUTO: 2.7 K/UL (ref 4.6–13.2)

## 2018-07-03 PROCEDURE — 80076 HEPATIC FUNCTION PANEL: CPT | Performed by: INTERNAL MEDICINE

## 2018-07-03 PROCEDURE — 80048 BASIC METABOLIC PNL TOTAL CA: CPT | Performed by: INTERNAL MEDICINE

## 2018-07-03 PROCEDURE — 85025 COMPLETE CBC W/AUTO DIFF WBC: CPT | Performed by: INTERNAL MEDICINE

## 2018-07-03 PROCEDURE — 36415 COLL VENOUS BLD VENIPUNCTURE: CPT | Performed by: INTERNAL MEDICINE

## (undated) DEVICE — SPONGE GZ W4XL4IN COT 12 PLY TYP VII WVN C FLD DSGN

## (undated) DEVICE — SKIN MARKER,REGULAR TIP WITH RULER AND LABELS: Brand: DEVON

## (undated) DEVICE — HYPODERMIC SAFETY NEEDLE: Brand: MAGELLAN

## (undated) DEVICE — INTENDED FOR TISSUE SEPARATION, AND OTHER PROCEDURES THAT REQUIRE A SHARP SURGICAL BLADE TO PUNCTURE OR CUT.: Brand: BARD-PARKER ®  SAFETY SCALPED

## (undated) DEVICE — (D)SYR 10ML 1/5ML GRAD NSAF -- PKGING CHANGE USE ITEM 338027

## (undated) DEVICE — MAX-CORE® DISPOSABLE CORE BIOPSY INSTRUMENT, 16G X 16CM: Brand: MAX-CORE

## (undated) DEVICE — (D)BNDG ADHESIVE FABRIC 3/4X3 -- DISC BY MFR USE ITEM 357960

## (undated) DEVICE — DRAPE,APERTURE,MINOR PROCEDURE: Brand: MEDLINE

## (undated) DEVICE — MAJ-1414 SINGLE USE ADPATER BIOPSY VALV: Brand: SINGLE USE ADAPTOR BIOPSY VALVE

## (undated) DEVICE — MAYO STAND COVER: Brand: CONVERTORS

## (undated) DEVICE — KENDALL RADIOLUCENT FOAM MONITORING ELECTRODE RECTANGULAR SHAPE: Brand: KENDALL

## (undated) DEVICE — PAD NON-ADHERENT 3X4 STRL LF --

## (undated) DEVICE — SOL IRRIGATION INJ NACL 0.9% 500ML BTL

## (undated) DEVICE — TRAY PREP DRY W/ PREM GLV 2 APPL 6 SPNG 2 UNDPD 1 OVERWRAP